# Patient Record
Sex: FEMALE | Race: BLACK OR AFRICAN AMERICAN | NOT HISPANIC OR LATINO | URBAN - METROPOLITAN AREA
[De-identification: names, ages, dates, MRNs, and addresses within clinical notes are randomized per-mention and may not be internally consistent; named-entity substitution may affect disease eponyms.]

---

## 2020-03-08 PROCEDURE — 99285 EMERGENCY DEPT VISIT HI MDM: CPT | Mod: 25

## 2020-03-08 PROCEDURE — 93010 ELECTROCARDIOGRAM REPORT: CPT | Mod: NC

## 2020-03-09 ENCOUNTER — INPATIENT (INPATIENT)
Facility: HOSPITAL | Age: 30
LOS: 2 days | Discharge: HOME | DRG: 814 | End: 2020-03-12
Attending: GENERAL ACUTE CARE HOSPITAL | Admitting: GENERAL ACUTE CARE HOSPITAL
Payer: COMMERCIAL

## 2020-03-09 VITALS
HEART RATE: 115 BPM | WEIGHT: 192.02 LBS | RESPIRATION RATE: 16 BRPM | OXYGEN SATURATION: 100 % | DIASTOLIC BLOOD PRESSURE: 83 MMHG | SYSTOLIC BLOOD PRESSURE: 133 MMHG | HEIGHT: 65 IN | TEMPERATURE: 101 F

## 2020-03-09 LAB
ALBUMIN SERPL ELPH-MCNC: 3.8 G/DL — SIGNIFICANT CHANGE UP (ref 3.4–5)
ALP SERPL-CCNC: 92 U/L — SIGNIFICANT CHANGE UP (ref 40–120)
ALT FLD-CCNC: 46 U/L — HIGH (ref 12–42)
ANION GAP SERPL CALC-SCNC: 11 MMOL/L — SIGNIFICANT CHANGE UP (ref 9–16)
ANION GAP SERPL CALC-SCNC: 9 MMOL/L — SIGNIFICANT CHANGE UP (ref 5–17)
APPEARANCE UR: CLEAR — SIGNIFICANT CHANGE UP
APTT BLD: 33.4 SEC — SIGNIFICANT CHANGE UP (ref 27.5–36.3)
APTT BLD: 34.4 SEC — SIGNIFICANT CHANGE UP (ref 27.5–36.3)
AST SERPL-CCNC: 50 U/L — HIGH (ref 15–37)
BASOPHILS # BLD AUTO: 0 K/UL — SIGNIFICANT CHANGE UP (ref 0–0.2)
BASOPHILS # BLD AUTO: 0.05 K/UL — SIGNIFICANT CHANGE UP (ref 0–0.2)
BASOPHILS NFR BLD AUTO: 0 % — SIGNIFICANT CHANGE UP (ref 0–2)
BASOPHILS NFR BLD AUTO: 0.9 % — SIGNIFICANT CHANGE UP (ref 0–2)
BILIRUB SERPL-MCNC: 0.3 MG/DL — SIGNIFICANT CHANGE UP (ref 0.2–1.2)
BILIRUB UR-MCNC: NEGATIVE — SIGNIFICANT CHANGE UP
BUN SERPL-MCNC: 5 MG/DL — LOW (ref 7–23)
BUN SERPL-MCNC: 7 MG/DL — SIGNIFICANT CHANGE UP (ref 7–23)
CALCIUM SERPL-MCNC: 8.1 MG/DL — LOW (ref 8.4–10.5)
CALCIUM SERPL-MCNC: 9 MG/DL — SIGNIFICANT CHANGE UP (ref 8.5–10.5)
CHLORIDE SERPL-SCNC: 100 MMOL/L — SIGNIFICANT CHANGE UP (ref 96–108)
CHLORIDE SERPL-SCNC: 107 MMOL/L — SIGNIFICANT CHANGE UP (ref 96–108)
CO2 SERPL-SCNC: 21 MMOL/L — LOW (ref 22–31)
CO2 SERPL-SCNC: 24 MMOL/L — SIGNIFICANT CHANGE UP (ref 22–31)
COLOR SPEC: YELLOW — SIGNIFICANT CHANGE UP
CREAT SERPL-MCNC: 0.73 MG/DL — SIGNIFICANT CHANGE UP (ref 0.5–1.3)
CREAT SERPL-MCNC: 0.97 MG/DL — SIGNIFICANT CHANGE UP (ref 0.5–1.3)
DIFF PNL FLD: ABNORMAL
EOSINOPHIL # BLD AUTO: 0 K/UL — SIGNIFICANT CHANGE UP (ref 0–0.5)
EOSINOPHIL # BLD AUTO: 0 K/UL — SIGNIFICANT CHANGE UP (ref 0–0.5)
EOSINOPHIL NFR BLD AUTO: 0 % — SIGNIFICANT CHANGE UP (ref 0–6)
EOSINOPHIL NFR BLD AUTO: 0 % — SIGNIFICANT CHANGE UP (ref 0–6)
FLU A RESULT: SIGNIFICANT CHANGE UP
FLU A RESULT: SIGNIFICANT CHANGE UP
FLUAV AG NPH QL: SIGNIFICANT CHANGE UP
FLUBV AG NPH QL: SIGNIFICANT CHANGE UP
GLUCOSE SERPL-MCNC: 100 MG/DL — HIGH (ref 70–99)
GLUCOSE SERPL-MCNC: 99 MG/DL — SIGNIFICANT CHANGE UP (ref 70–99)
GLUCOSE UR QL: NEGATIVE — SIGNIFICANT CHANGE UP
HCG UR QL: NEGATIVE — SIGNIFICANT CHANGE UP
HCT VFR BLD CALC: 38.1 % — SIGNIFICANT CHANGE UP (ref 34.5–45)
HCT VFR BLD CALC: 39.9 % — SIGNIFICANT CHANGE UP (ref 34.5–45)
HGB BLD-MCNC: 12.8 G/DL — SIGNIFICANT CHANGE UP (ref 11.5–15.5)
HGB BLD-MCNC: 13.5 G/DL — SIGNIFICANT CHANGE UP (ref 11.5–15.5)
INR BLD: 1.14 — SIGNIFICANT CHANGE UP (ref 0.88–1.16)
INR BLD: 1.16 — SIGNIFICANT CHANGE UP (ref 0.88–1.16)
KETONES UR-MCNC: NEGATIVE — SIGNIFICANT CHANGE UP
LACTATE SERPL-SCNC: 1.4 MMOL/L — SIGNIFICANT CHANGE UP (ref 0.4–2)
LEUKOCYTE ESTERASE UR-ACNC: NEGATIVE — SIGNIFICANT CHANGE UP
LYMPHOCYTES # BLD AUTO: 2.57 K/UL — SIGNIFICANT CHANGE UP (ref 1–3.3)
LYMPHOCYTES # BLD AUTO: 3.17 K/UL — SIGNIFICANT CHANGE UP (ref 1–3.3)
LYMPHOCYTES # BLD AUTO: 35 % — SIGNIFICANT CHANGE UP (ref 13–44)
LYMPHOCYTES # BLD AUTO: 57 % — HIGH (ref 13–44)
MAGNESIUM SERPL-MCNC: 1.7 MG/DL — SIGNIFICANT CHANGE UP (ref 1.6–2.6)
MCHC RBC-ENTMCNC: 30.3 PG — SIGNIFICANT CHANGE UP (ref 27–34)
MCHC RBC-ENTMCNC: 30.6 PG — SIGNIFICANT CHANGE UP (ref 27–34)
MCHC RBC-ENTMCNC: 33.6 GM/DL — SIGNIFICANT CHANGE UP (ref 32–36)
MCHC RBC-ENTMCNC: 33.8 GM/DL — SIGNIFICANT CHANGE UP (ref 32–36)
MCV RBC AUTO: 89.5 FL — SIGNIFICANT CHANGE UP (ref 80–100)
MCV RBC AUTO: 91.1 FL — SIGNIFICANT CHANGE UP (ref 80–100)
MONOCYTES # BLD AUTO: 0 K/UL — SIGNIFICANT CHANGE UP (ref 0–0.9)
MONOCYTES # BLD AUTO: 0.58 K/UL — SIGNIFICANT CHANGE UP (ref 0–0.9)
MONOCYTES NFR BLD AUTO: 0 % — LOW (ref 2–14)
MONOCYTES NFR BLD AUTO: 10.5 % — SIGNIFICANT CHANGE UP (ref 2–14)
NEUTROPHILS # BLD AUTO: 1.62 K/UL — LOW (ref 1.8–7.4)
NEUTROPHILS # BLD AUTO: 1.76 K/UL — LOW (ref 1.8–7.4)
NEUTROPHILS NFR BLD AUTO: 20 % — LOW (ref 43–77)
NEUTROPHILS NFR BLD AUTO: 28.1 % — LOW (ref 43–77)
NITRITE UR-MCNC: NEGATIVE — SIGNIFICANT CHANGE UP
NRBC # BLD: 0 /100 WBCS — SIGNIFICANT CHANGE UP (ref 0–0)
NRBC # BLD: SIGNIFICANT CHANGE UP /100 WBCS (ref 0–0)
PH UR: 6 — SIGNIFICANT CHANGE UP (ref 5–8)
PHOSPHATE SERPL-MCNC: 3 MG/DL — SIGNIFICANT CHANGE UP (ref 2.5–4.5)
PLATELET # BLD AUTO: 198 K/UL — SIGNIFICANT CHANGE UP (ref 150–400)
PLATELET # BLD AUTO: 225 K/UL — SIGNIFICANT CHANGE UP (ref 150–400)
POTASSIUM SERPL-MCNC: 3.3 MMOL/L — LOW (ref 3.5–5.3)
POTASSIUM SERPL-MCNC: 3.8 MMOL/L — SIGNIFICANT CHANGE UP (ref 3.5–5.3)
POTASSIUM SERPL-SCNC: 3.3 MMOL/L — LOW (ref 3.5–5.3)
POTASSIUM SERPL-SCNC: 3.8 MMOL/L — SIGNIFICANT CHANGE UP (ref 3.5–5.3)
PROT SERPL-MCNC: 8.1 G/DL — SIGNIFICANT CHANGE UP (ref 6.4–8.2)
PROT UR-MCNC: NEGATIVE MG/DL — SIGNIFICANT CHANGE UP
PROTHROM AB SERPL-ACNC: 12.9 SEC — SIGNIFICANT CHANGE UP (ref 10–12.9)
PROTHROM AB SERPL-ACNC: 13 SEC — HIGH (ref 10–12.9)
RBC # BLD: 4.18 M/UL — SIGNIFICANT CHANGE UP (ref 3.8–5.2)
RBC # BLD: 4.46 M/UL — SIGNIFICANT CHANGE UP (ref 3.8–5.2)
RBC # FLD: 12.6 % — SIGNIFICANT CHANGE UP (ref 10.3–14.5)
RBC # FLD: 12.7 % — SIGNIFICANT CHANGE UP (ref 10.3–14.5)
RSV RESULT: SIGNIFICANT CHANGE UP
RSV RNA RESP QL NAA+PROBE: SIGNIFICANT CHANGE UP
SODIUM SERPL-SCNC: 135 MMOL/L — SIGNIFICANT CHANGE UP (ref 132–145)
SODIUM SERPL-SCNC: 137 MMOL/L — SIGNIFICANT CHANGE UP (ref 135–145)
SP GR SPEC: <=1.005 — SIGNIFICANT CHANGE UP (ref 1–1.03)
UROBILINOGEN FLD QL: 0.2 E.U./DL — SIGNIFICANT CHANGE UP
WBC # BLD: 5.57 K/UL — SIGNIFICANT CHANGE UP (ref 3.8–10.5)
WBC # BLD: 7.35 K/UL — SIGNIFICANT CHANGE UP (ref 3.8–10.5)
WBC # FLD AUTO: 5.57 K/UL — SIGNIFICANT CHANGE UP (ref 3.8–10.5)
WBC # FLD AUTO: 7.35 K/UL — SIGNIFICANT CHANGE UP (ref 3.8–10.5)

## 2020-03-09 PROCEDURE — 93970 EXTREMITY STUDY: CPT | Mod: 26

## 2020-03-09 PROCEDURE — 99222 1ST HOSP IP/OBS MODERATE 55: CPT

## 2020-03-09 PROCEDURE — 99253 IP/OBS CNSLTJ NEW/EST LOW 45: CPT | Mod: GC

## 2020-03-09 PROCEDURE — 99220: CPT | Mod: 25

## 2020-03-09 PROCEDURE — 71045 X-RAY EXAM CHEST 1 VIEW: CPT | Mod: 26

## 2020-03-09 PROCEDURE — 74174 CTA ABD&PLVS W/CONTRAST: CPT | Mod: 26

## 2020-03-09 PROCEDURE — 71275 CT ANGIOGRAPHY CHEST: CPT | Mod: 26

## 2020-03-09 PROCEDURE — 74177 CT ABD & PELVIS W/CONTRAST: CPT | Mod: 26,59

## 2020-03-09 PROCEDURE — 93306 TTE W/DOPPLER COMPLETE: CPT | Mod: 26

## 2020-03-09 RX ORDER — METRONIDAZOLE 500 MG
500 TABLET ORAL ONCE
Refills: 0 | Status: COMPLETED | OUTPATIENT
Start: 2020-03-09 | End: 2020-03-09

## 2020-03-09 RX ORDER — SODIUM CHLORIDE 9 MG/ML
1000 INJECTION, SOLUTION INTRAVENOUS
Refills: 0 | Status: DISCONTINUED | OUTPATIENT
Start: 2020-03-09 | End: 2020-03-11

## 2020-03-09 RX ORDER — CEFTRIAXONE 500 MG/1
1000 INJECTION, POWDER, FOR SOLUTION INTRAMUSCULAR; INTRAVENOUS ONCE
Refills: 0 | Status: COMPLETED | OUTPATIENT
Start: 2020-03-09 | End: 2020-03-09

## 2020-03-09 RX ORDER — KETOROLAC TROMETHAMINE 30 MG/ML
30 SYRINGE (ML) INJECTION ONCE
Refills: 0 | Status: DISCONTINUED | OUTPATIENT
Start: 2020-03-09 | End: 2020-03-09

## 2020-03-09 RX ORDER — ONDANSETRON 8 MG/1
4 TABLET, FILM COATED ORAL EVERY 6 HOURS
Refills: 0 | Status: DISCONTINUED | OUTPATIENT
Start: 2020-03-09 | End: 2020-03-12

## 2020-03-09 RX ORDER — ACETAMINOPHEN 500 MG
1000 TABLET ORAL ONCE
Refills: 0 | Status: DISCONTINUED | OUTPATIENT
Start: 2020-03-09 | End: 2020-03-12

## 2020-03-09 RX ORDER — SODIUM CHLORIDE 9 MG/ML
2700 INJECTION INTRAMUSCULAR; INTRAVENOUS; SUBCUTANEOUS ONCE
Refills: 0 | Status: COMPLETED | OUTPATIENT
Start: 2020-03-09 | End: 2020-03-09

## 2020-03-09 RX ORDER — IOHEXOL 300 MG/ML
30 INJECTION, SOLUTION INTRAVENOUS ONCE
Refills: 0 | Status: COMPLETED | OUTPATIENT
Start: 2020-03-09 | End: 2020-03-09

## 2020-03-09 RX ADMIN — SODIUM CHLORIDE 2700 MILLILITER(S): 9 INJECTION INTRAMUSCULAR; INTRAVENOUS; SUBCUTANEOUS at 00:30

## 2020-03-09 RX ADMIN — Medication 30 MILLIGRAM(S): at 00:31

## 2020-03-09 RX ADMIN — Medication 100 MILLIGRAM(S): at 00:30

## 2020-03-09 RX ADMIN — IOHEXOL 30 MILLILITER(S): 300 INJECTION, SOLUTION INTRAVENOUS at 00:31

## 2020-03-09 RX ADMIN — CEFTRIAXONE 100 MILLIGRAM(S): 500 INJECTION, POWDER, FOR SOLUTION INTRAMUSCULAR; INTRAVENOUS at 00:30

## 2020-03-09 NOTE — H&P ADULT - ASSESSMENT
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

## 2020-03-09 NOTE — ED CDU PROVIDER INITIAL DAY NOTE - MEDICAL DECISION MAKING DETAILS
Upper abd pain and fever today.  Ct abd ordered, labs, abx.  Sepsis code protocol followed.  Will place in CDU for diagnostic uncertainty and therapeutic intensity.

## 2020-03-09 NOTE — ED ADULT NURSE NOTE - CHPI ED NUR SYMPTOMS NEG
no diarrhea/no chills/no abdominal distension/no blood in stool/no burning urination/no hematuria/no vomiting/no dysuria/no nausea

## 2020-03-09 NOTE — H&P ADULT - HISTORY OF PRESENT ILLNESS
28 yo F with no significant PMH, PSH of D&Cx2 presents with 1 day history of abdominal pain and subjective fever. Patient reports that the pain began yesterday morning. Patient reports initially it felt like gas pain but it wouldn't go away. Patient reports she took her temperature at home and it was 100.8 so she went to the ER. Patient describes the pain as a vague ache-like epigastric pain. Patient denies nausea, vomiting, chest pain, and shortness of breath. Patient reports that she is passing flatus and had a bowel movement yesterday. Patient last had something to eat around 11pm yesterday when she had some crackers.

## 2020-03-09 NOTE — CONSULT NOTE ADULT - SUBJECTIVE AND OBJECTIVE BOX
Vascular Attending:        HPI:  28 yo F with no significant PMH, PSH of D&Cx2 presents with 1 day history of abdominal pain and subjective fever. Patient reports that the pain began yesterday morning. Patient reports initially it felt like gas pain but it wouldn't go away. Patient reports she took her temperature at home and it was 100.8 so she went to the ER. Patient describes the pain as a vague ache-like epigastric pain. Patient denies nausea, vomiting, chest pain, and shortness of breath. Patient reports that she is passing flatus and had a bowel movement yesterday. Patient last had something to eat around 11pm yesterday when she had some crackers. (09 Mar 2020 08:15)    Vascular consulted for < from: CT Abdomen and Pelvis w/ Oral Cont and w/ IV Cont (03.09.20 @ 03:11) > IMPRESSION: A few wedge-shaped areas of hypodensity in the spleen suspicious for infarctions. < end of copied text > Patient denies smoking, recent travel, BCP, CP/SOB or thrombus.         PAST MEDICAL & SURGICAL HISTORY:  No pertinent past medical history  No significant past surgical history      MEDICATIONS  (STANDING):  acetaminophen  IVPB .. 1000 milliGRAM(s) IV Intermittent once  lactated ringers. 1000 milliLiter(s) (100 mL/Hr) IV Continuous <Continuous>    MEDICATIONS  (PRN):  ondansetron Injectable 4 milliGRAM(s) IV Push every 6 hours PRN Nausea and/or Vomiting      Allergies    No Known Allergies    Intolerances        SOCIAL HISTORY:    FAMILY HISTORY:      Vital Signs Last 24 Hrs  T(C): 36.3 (09 Mar 2020 08:09), Max: 38.6 (09 Mar 2020 00:00)  T(F): 97.3 (09 Mar 2020 08:09), Max: 101.4 (09 Mar 2020 00:00)  HR: 63 (09 Mar 2020 08:09) (63 - 115)  BP: 113/71 (09 Mar 2020 08:09) (110/75 - 133/83)  BP(mean): 83 (09 Mar 2020 01:43) (83 - 83)  RR: 17 (09 Mar 2020 08:09) (16 - 17)  SpO2: 98% (09 Mar 2020 08:09) (98% - 100%)    PHYSICAL EXAM:      Constitutional: NAD    Respiratory: nonlabored breathing    Cardiovascular: s1s2 RRR    Gastrointestinal: soft nt/nd    Extremities: WWP    Vascular: 2+ pal pulses throughout    Neurological: intact      LABS:                        12.8   5.57  )-----------( 198      ( 09 Mar 2020 10:50 )             38.1     03-09    137  |  107  |  5<L>  ----------------------------<  99  3.8   |  21<L>  |  0.73    Ca    8.1<L>      09 Mar 2020 10:50    TPro  8.1  /  Alb  3.8  /  TBili  0.3  /  DBili  x   /  AST  50<H>  /  ALT  46<H>  /  AlkPhos  92  03-09    PT/INR - ( 09 Mar 2020 00:29 )   PT: 12.9 sec;   INR: 1.16          PTT - ( 09 Mar 2020 00:29 )  PTT:33.4 sec  Urinalysis Basic - ( 09 Mar 2020 00:29 )    Color: Yellow / Appearance: Clear / SG: <=1.005 / pH: x  Gluc: x / Ketone: NEGATIVE  / Bili: NEGATIVE / Urobili: 0.2 E.U./dL   Blood: x / Protein: NEGATIVE mg/dL / Nitrite: NEGATIVE   Leuk Esterase: NEGATIVE / RBC: < 5 /HPF / WBC < 5 /HPF   Sq Epi: x / Non Sq Epi: 0-5 /HPF / Bacteria: Present /HPF

## 2020-03-09 NOTE — ED ADULT TRIAGE NOTE - CHIEF COMPLAINT QUOTE
Walk in with c/o epigastric pain, mild nausea, loss of appetite and subjective fevers x1week. No v/d.

## 2020-03-09 NOTE — H&P ADULT - NSHPLABSRESULTS_GEN_ALL_CORE
13.5   7.35  )-----------( 225      ( 09 Mar 2020 00:29 )             39.9       03-09    135  |  100  |  7   ----------------------------<  100<H>  3.3<L>   |  24  |  0.97    Ca    9.0      09 Mar 2020 00:29    TPro  8.1  /  Alb  3.8  /  TBili  0.3  /  DBili  x   /  AST  50<H>  /  ALT  46<H>  /  AlkPhos  92  03-09      < from: CT Abdomen and Pelvis w/ Oral Cont and w/ IV Cont (03.09.20 @ 03:11) >    EXAM:  CT ABDOMEN AND PELVIS OC IC                           PROCEDURE DATE:  03/09/2020          INTERPRETATION:  CT of the ABDOMEN and PELVIS with intravenous contrast dated 3/9/2020 3:11 AM    INDICATION: fever 101 and upper abd pain    TECHNIQUE: CT of the abdomen and pelvis was performed using oral and intravenous contrast. Axial, sagittal and coronal images were produced and reviewed.    PRIOR STUDIES: None.    FINDINGS: Images of the lower chest demonstrate clear lung bases.    The liveris normal in appearance.  No radiopaque stones are seen in the gallbladder.  The pancreas is normal in appearance.  The spleen demonstrates a few wedge shaped areas of hypodensity suspicious for infarctions.     The adrenal glands are unremarkable. The kidneys are normal in appearance.        No abdominal aortic aneurysm is seen. No lymphadenopathy is seen.     Evaluation of the bowel demonstrates no obstruction or appreciable wall thickening. Normal appendix. No pneumoperitoneum. Tiny fat-containing umbilical hernia. No ascites is seen.    Images of the pelvis demonstrate the uterus and adnexae to be normal in appearance.  Tampon within the vagina. No filling defects in the bladder.    Evaluation of the osseous structures demonstrates no significant abnormality.      IMPRESSION:  A few wedge-shaped areas of hypodensity in the spleen suspicious for infarctions.                Thank you for the opportunity to participate in the care of this patient.    < end of copied text >

## 2020-03-09 NOTE — H&P ADULT - NSHPPHYSICALEXAM_GEN_ALL_CORE
Gen: NAD, resting comfortably in bed  Resp: CTABL  Cardiac: RRR. no murmurs, rubs, or gallops  Abd: soft, nondistended, minimal epigastric tenderness.   Extremities: WWP, no edema 2+ PT pulses bilaterally

## 2020-03-10 LAB
ANION GAP SERPL CALC-SCNC: 13 MMOL/L — SIGNIFICANT CHANGE UP (ref 5–17)
BUN SERPL-MCNC: 6 MG/DL — LOW (ref 7–23)
CALCIUM SERPL-MCNC: 8.8 MG/DL — SIGNIFICANT CHANGE UP (ref 8.4–10.5)
CHLORIDE SERPL-SCNC: 101 MMOL/L — SIGNIFICANT CHANGE UP (ref 96–108)
CO2 SERPL-SCNC: 22 MMOL/L — SIGNIFICANT CHANGE UP (ref 22–31)
CREAT SERPL-MCNC: 0.8 MG/DL — SIGNIFICANT CHANGE UP (ref 0.5–1.3)
CULTURE RESULTS: SIGNIFICANT CHANGE UP
FACT V ACT/NOR PPP: 108 % — SIGNIFICANT CHANGE UP (ref 70–143)
GLUCOSE SERPL-MCNC: 86 MG/DL — SIGNIFICANT CHANGE UP (ref 70–99)
HCT VFR BLD CALC: 40.2 % — SIGNIFICANT CHANGE UP (ref 34.5–45)
HGB BLD-MCNC: 13.5 G/DL — SIGNIFICANT CHANGE UP (ref 11.5–15.5)
MAGNESIUM SERPL-MCNC: 1.8 MG/DL — SIGNIFICANT CHANGE UP (ref 1.6–2.6)
MCHC RBC-ENTMCNC: 30.3 PG — SIGNIFICANT CHANGE UP (ref 27–34)
MCHC RBC-ENTMCNC: 33.6 GM/DL — SIGNIFICANT CHANGE UP (ref 32–36)
MCV RBC AUTO: 90.1 FL — SIGNIFICANT CHANGE UP (ref 80–100)
NRBC # BLD: 0 /100 WBCS — SIGNIFICANT CHANGE UP (ref 0–0)
PHOSPHATE SERPL-MCNC: 3.5 MG/DL — SIGNIFICANT CHANGE UP (ref 2.5–4.5)
PLATELET # BLD AUTO: 215 K/UL — SIGNIFICANT CHANGE UP (ref 150–400)
POTASSIUM SERPL-MCNC: 3.4 MMOL/L — LOW (ref 3.5–5.3)
POTASSIUM SERPL-SCNC: 3.4 MMOL/L — LOW (ref 3.5–5.3)
RBC # BLD: 4.46 M/UL — SIGNIFICANT CHANGE UP (ref 3.8–5.2)
RBC # FLD: 12.5 % — SIGNIFICANT CHANGE UP (ref 10.3–14.5)
SODIUM SERPL-SCNC: 136 MMOL/L — SIGNIFICANT CHANGE UP (ref 135–145)
SPECIMEN SOURCE: SIGNIFICANT CHANGE UP
WBC # BLD: 8.3 K/UL — SIGNIFICANT CHANGE UP (ref 3.8–10.5)
WBC # FLD AUTO: 8.3 K/UL — SIGNIFICANT CHANGE UP (ref 3.8–10.5)

## 2020-03-10 PROCEDURE — 99253 IP/OBS CNSLTJ NEW/EST LOW 45: CPT

## 2020-03-10 PROCEDURE — 99232 SBSQ HOSP IP/OBS MODERATE 35: CPT

## 2020-03-10 PROCEDURE — 93308 TTE F-UP OR LMTD: CPT | Mod: 26

## 2020-03-10 RX ORDER — APIXABAN 2.5 MG/1
5 TABLET, FILM COATED ORAL EVERY 12 HOURS
Refills: 0 | Status: CANCELLED | OUTPATIENT
Start: 2020-03-17 | End: 2020-03-12

## 2020-03-10 RX ORDER — APIXABAN 2.5 MG/1
10 TABLET, FILM COATED ORAL EVERY 12 HOURS
Refills: 0 | Status: DISCONTINUED | OUTPATIENT
Start: 2020-03-10 | End: 2020-03-12

## 2020-03-10 RX ORDER — POTASSIUM CHLORIDE 20 MEQ
40 PACKET (EA) ORAL EVERY 4 HOURS
Refills: 0 | Status: COMPLETED | OUTPATIENT
Start: 2020-03-10 | End: 2020-03-10

## 2020-03-10 RX ADMIN — Medication 40 MILLIEQUIVALENT(S): at 13:19

## 2020-03-10 RX ADMIN — APIXABAN 10 MILLIGRAM(S): 2.5 TABLET, FILM COATED ORAL at 17:32

## 2020-03-10 RX ADMIN — Medication 40 MILLIEQUIVALENT(S): at 17:32

## 2020-03-10 NOTE — PROGRESS NOTE ADULT - ASSESSMENT
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     - echo with bubble study  - Please f/u hypercoagulable work up  - Recommend started pt on Eliquis 10 mg BID X1week, then Eliquis 5 mg BIDx3 weeks for a total of one month of anticoagulation  - d/w chief resident and Dr. 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     - echo with bubble study demonstrates possibility of intracardiac shunt vs. pulmonary AV malformation, please consult cardiology for further evaluation of possible PFO   - Please f/u hypercoagulable work up  - Recommend started pt on Eliquis 10 mg BID X1week, then Eliquis 5 mg BIDx3 weeks for a total of one month of anticoagulation  - d/w chief resident and Dr. Smart

## 2020-03-10 NOTE — PROGRESS NOTE ADULT - SUBJECTIVE AND OBJECTIVE BOX
SUBJECTIVE: Afebrile, HD stable with SAHRA overnight, tolerating diet, reports equivocal LUQ pain, denies N/V/ fever/ chills.     Vital Signs Last 24 Hrs  T(C): 37.3 (10 Mar 2020 05:01), Max: 37.4 (09 Mar 2020 13:59)  T(F): 99.2 (10 Mar 2020 05:01), Max: 99.3 (09 Mar 2020 13:59)  HR: 81 (10 Mar 2020 05:01) (60 - 81)  BP: 121/69 (10 Mar 2020 05:01) (111/71 - 121/69)  BP(mean): --  RR: 17 (10 Mar 2020 05:01) (15 - 17)  SpO2: 99% (10 Mar 2020 05:01) (97% - 100%)    General: NAD, resting comfortably  Neuro: AAOX3, EOMI, PERRLA, no scleral icterus  Pulm: CTAB, Nonlabored breathing  CV: S1/S2 normal, no murmurs  Abdomen: soft, nondistended, mild equivocal LUQ tenderness, no rebound, no guarding  Extremities: WWP, No LE edema b/l        LABS:                        12.8   5.57  )-----------( 198      ( 09 Mar 2020 10:50 )             38.1     03-09    137  |  107  |  5<L>  ----------------------------<  99  3.8   |  21<L>  |  0.73    Ca    8.1<L>      09 Mar 2020 10:50  Phos  3.0     03-09  Mg     1.7     03-09    TPro  8.1  /  Alb  3.8  /  TBili  0.3  /  DBili  x   /  AST  50<H>  /  ALT  46<H>  /  AlkPhos  92  03-09    PT/INR - ( 09 Mar 2020 16:58 )   PT: 13.0 sec;   INR: 1.14       < from: TTE Echo Complete w/o contrast w/ Doppler (03.09.20 @ 11:50) >  --------------------------------------------------------------------------------  CONCLUSIONS:     1. Normal left and right ventricular size and systolic function.   2. No significant valvular disease.   3. Small pericardial effusion.    < end of copied text >    < from: US Duplex Venous Lower Ext Complete, Bilateral (03.09.20 @ 14:30) >      IMPRESSION:  No vein thrombosis seen.    < end of copied text >         PTT - ( 09 Mar 2020 16:58 )  PTT:34.4 sec    < from: CT Angio Abdomen and Pelvis w/ IV Cont (03.09.20 @ 15:26) >    History: 30yo F with splenic infarcts.    Technique: CTA (CT ANGIOGRAPHY) of the chest, abdomen and pelvis was then performed using intravenous contrast. Multiplanar images of the chest, abdomen and pelvis were reconstructed on an independent work-station. Image postprocessing including production of axial, coronal and sagittal multiplanar reformatted images and coronal and sagittal maximum intensity projections (MIPs).     Comparison: CT abdomen pelvis from 3/9/2020 at 3:02 AM.    Findings:     Lungs and large airways: 0.5 cm left upper lobe pulmonary nodule on image 309 series 6, branching left upper lobe pulmonary nodules on image 325 series 6 consistent with small airway disease.    Pleura:  No pleural effusion.    Mediastinum and hilar regions: No thoracic lymphadenopathy.    Heart and pericardium:  Heart size is normal. No pericardial effusion.    Vessels:  Unremarkable. No evidence of aortic aneurysm, aortic dissection, aortic ulcerated plaques or aortic filling defects. No filling defects seen in the SMA, celiac axis, splenic artery, KIM.     Chest wall and lower neck:  Unremarkable.    Liver:  Unremarkable. Heterogeneous enhancement consistent with arterial phase imaging.    Gallbladder: No radiopaque stones gallbladder.    Spleen:  Again seen are multiple wedge-shaped areas of hypodensity within the spleen compatible with multiple splenic infarcts. There has been an apparent increase in the size and number of the splenic infarcts, however this is study was performed in the arterial phase and the prior study inthe portal venous phase, so it is somewhat difficult to make accurate comparisons of the splenic enhancement between the 2 studies.    Pancreas:  Unremarkable.    Adrenal glands:  Unremarkable.    Kidneys: Unremarkable.    Abdominal and pelvic adenopathy:  No lymphadenopathy in abdomen or pelvis.    Ascites: None.    Gastrointestinal tract: Evaluation of the GI tract shows oral contrast to the level of the rectum. This contrast is residual from prior CT from 3/9/2020. There is no appreciable luminal dilation or bowel wall thickening. The appendix is normal    Pelvic organs: Within normal limits.    Soft tissues: Within normal limits.    Bones: Within normal limits.    Impression:   Multiple splenic infarcts again noted, with an apparent increase in size and number when compared to the prior CT.         < end of copied text > SUBJECTIVE: Afebrile, HD stable with SAHRA overnight, tolerating diet, reports equivocal LUQ pain, denies N/V/ fever/ chills.     Vital Signs Last 24 Hrs  T(C): 37.3 (10 Mar 2020 05:01), Max: 37.4 (09 Mar 2020 13:59)  T(F): 99.2 (10 Mar 2020 05:01), Max: 99.3 (09 Mar 2020 13:59)  HR: 81 (10 Mar 2020 05:01) (60 - 81)  BP: 121/69 (10 Mar 2020 05:01) (111/71 - 121/69)  BP(mean): --  RR: 17 (10 Mar 2020 05:01) (15 - 17)  SpO2: 99% (10 Mar 2020 05:01) (97% - 100%)    General: NAD, resting comfortably  Neuro: AAOX3, EOMI, PERRLA, no scleral icterus  Pulm: CTAB, Nonlabored breathing  CV: S1/S2 normal, no murmurs  Abdomen: soft, nondistended, mild equivocal LUQ tenderness, no rebound, no guarding  Extremities: WWP, No LE edema b/l        LABS:                        12.8   5.57  )-----------( 198      ( 09 Mar 2020 10:50 )             38.1     03-09    137  |  107  |  5<L>  ----------------------------<  99  3.8   |  21<L>  |  0.73    Ca    8.1<L>      09 Mar 2020 10:50  Phos  3.0     03-09  Mg     1.7     03-09    TPro  8.1  /  Alb  3.8  /  TBili  0.3  /  DBili  x   /  AST  50<H>  /  ALT  46<H>  /  AlkPhos  92  03-09    PT/INR - ( 09 Mar 2020 16:58 )   PT: 13.0 sec;   INR: 1.14       < from: TTE Echo Complete w/o contrast w/ Doppler (03.09.20 @ 11:50) >  --------------------------------------------------------------------------------  CONCLUSIONS:     1. Normal left and right ventricular size and systolic function.   2. No significant valvular disease.   3. Small pericardial effusion.    < end of copied text >    < from: US Duplex Venous Lower Ext Complete, Bilateral (03.09.20 @ 14:30) >      IMPRESSION:  No vein thrombosis seen.    < end of copied text >         PTT - ( 09 Mar 2020 16:58 )  PTT:34.4 sec    < from: CT Angio Abdomen and Pelvis w/ IV Cont (03.09.20 @ 15:26) >    History: 28yo F with splenic infarcts.    Technique: CTA (CT ANGIOGRAPHY) of the chest, abdomen and pelvis was then performed using intravenous contrast. Multiplanar images of the chest, abdomen and pelvis were reconstructed on an independent work-station. Image postprocessing including production of axial, coronal and sagittal multiplanar reformatted images and coronal and sagittal maximum intensity projections (MIPs).     Comparison: CT abdomen pelvis from 3/9/2020 at 3:02 AM.    Findings:     Lungs and large airways: 0.5 cm left upper lobe pulmonary nodule on image 309 series 6, branching left upper lobe pulmonary nodules on image 325 series 6 consistent with small airway disease.    Pleura:  No pleural effusion.    Mediastinum and hilar regions: No thoracic lymphadenopathy.    Heart and pericardium:  Heart size is normal. No pericardial effusion.    Vessels:  Unremarkable. No evidence of aortic aneurysm, aortic dissection, aortic ulcerated plaques or aortic filling defects. No filling defects seen in the SMA, celiac axis, splenic artery, KIM.     Chest wall and lower neck:  Unremarkable.    Liver:  Unremarkable. Heterogeneous enhancement consistent with arterial phase imaging.    Gallbladder: No radiopaque stones gallbladder.    Spleen:  Again seen are multiple wedge-shaped areas of hypodensity within the spleen compatible with multiple splenic infarcts. There has been an apparent increase in the size and number of the splenic infarcts, however this is study was performed in the arterial phase and the prior study inthe portal venous phase, so it is somewhat difficult to make accurate comparisons of the splenic enhancement between the 2 studies.    Pancreas:  Unremarkable.    Adrenal glands:  Unremarkable.    Kidneys: Unremarkable.    Abdominal and pelvic adenopathy:  No lymphadenopathy in abdomen or pelvis.    Ascites: None.    Gastrointestinal tract: Evaluation of the GI tract shows oral contrast to the level of the rectum. This contrast is residual from prior CT from 3/9/2020. There is no appreciable luminal dilation or bowel wall thickening. The appendix is normal    Pelvic organs: Within normal limits.    Soft tissues: Within normal limits.    Bones: Within normal limits.    Impression:   Multiple splenic infarcts again noted, with an apparent increase in size and number when compared to the prior CT.         < end of copied text >    < from: Echo W Bubble w/o Doppler Complete (03.10.20 @ 09:35) >  FINDINGS:     Interatrial Septum:  Injection with agitated saline reveals late bubbles (after 3 heart beats) - difficult to determine intracardiac shunt vs pulmonary AV malformation.     -----------------    < end of copied text >

## 2020-03-10 NOTE — PROGRESS NOTE ADULT - SUBJECTIVE AND OBJECTIVE BOX
SUBJECTIVE: Patient seen and examined bedside. Patient has no complaints. Patient is passing flatus.        Vital Signs Last 24 Hrs  T(C): 37.3 (10 Mar 2020 05:01), Max: 37.4 (09 Mar 2020 13:59)  T(F): 99.2 (10 Mar 2020 05:01), Max: 99.3 (09 Mar 2020 13:59)  HR: 81 (10 Mar 2020 05:01) (60 - 81)  BP: 121/69 (10 Mar 2020 05:01) (111/71 - 121/69)  BP(mean): --  RR: 17 (10 Mar 2020 05:01) (15 - 17)  SpO2: 99% (10 Mar 2020 05:01) (97% - 100%)  I&O's Detail    09 Mar 2020 07:01  -  10 Mar 2020 07:00  --------------------------------------------------------  IN:    lactated ringers.: 1200 mL  Total IN: 1200 mL    OUT:    Voided: 2100 mL  Total OUT: 2100 mL    Total NET: -900 mL          General: NAD, resting comfortably in bed  C/V: NSR  Pulm: Nonlabored breathing, no respiratory distress  Abd: soft, nondistended, minimally LUQ tenderness,  Extrem: WWP, no edema, SCDs in place        LABS:                        12.8   5.57  )-----------( 198      ( 09 Mar 2020 10:50 )             38.1     03-09    137  |  107  |  5<L>  ----------------------------<  99  3.8   |  21<L>  |  0.73    Ca    8.1<L>      09 Mar 2020 10:50  Phos  3.0     03-09  Mg     1.7     03-09    TPro  8.1  /  Alb  3.8  /  TBili  0.3  /  DBili  x   /  AST  50<H>  /  ALT  46<H>  /  AlkPhos  92  03-09    PT/INR - ( 09 Mar 2020 16:58 )   PT: 13.0 sec;   INR: 1.14          PTT - ( 09 Mar 2020 16:58 )  PTT:34.4 sec  Urinalysis Basic - ( 09 Mar 2020 00:29 )    Color: Yellow / Appearance: Clear / SG: <=1.005 / pH: x  Gluc: x / Ketone: NEGATIVE  / Bili: NEGATIVE / Urobili: 0.2 E.U./dL   Blood: x / Protein: NEGATIVE mg/dL / Nitrite: NEGATIVE   Leuk Esterase: NEGATIVE / RBC: < 5 /HPF / WBC < 5 /HPF   Sq Epi: x / Non Sq Epi: 0-5 /HPF / Bacteria: Present /HPF        RADIOLOGY & ADDITIONAL STUDIES:

## 2020-03-10 NOTE — CONSULT NOTE ADULT - SUBJECTIVE AND OBJECTIVE BOX
HPI:  28 yo F with no significant PMH, PSH of D&Cx2 presents with 1 day history of abdominal pain and subjective fever, admitted with multiple splenic infarcts. She began experiencing pain over the past few days which was intolerable on Sunday. Denies any associated nausea, vomitting, diaphoresis. She did experience dyspepsia with the abdominal discomfort. She took her temperature at home and it was 100.8 so she went to the ER. Denies history of blood clots, recent travel, OCP use, spontaneous abortions, family history of SCD.       CARDIAC DIAGNOSTIC TESTING:        ECG: NSR    ECHO FINDINGS:  1. Normal left and right ventricular size and systolic function.  2. No significant valvular disease.  3. Small pericardial effusion.  4. Injection with agitated saline reveals late bubbles (after 3 heart beats) - difficult to determine  intracardiac shunt vs pulmonary AV malformation.    HOME MEDICATIONS  none    MEDICATIONS  (STANDING):  acetaminophen  IVPB .. 1000 milliGRAM(s) IV Intermittent once  apixaban 10 milliGRAM(s) Oral every 12 hours  lactated ringers. 1000 milliLiter(s) (100 mL/Hr) IV Continuous <Continuous>    MEDICATIONS  (PRN):  ondansetron Injectable 4 milliGRAM(s) IV Push every 6 hours PRN Nausea and/or Vomiting      FAMILY HISTORY:  Mother w/ CAD  Denies family history of CVA, VTE, SCD    SOCIAL HISTORY:  - Smoking: denies  - Alcohol: social  - Recreational drug use: denies      REVIEW OF SYSTEMS:  12 pt ROS negative except as stated in HPI    Vitals past 24 Hours: T(C): 37.2 (03-10-20 @ 16:58), Max: 37.3 (03-10-20 @ 05:01)  HR: 79 (03-10-20 @ 16:58) (72 - 81)  BP: 120/75 (03-10-20 @ 16:58) (110/71 - 121/69)  RR: 17 (03-10-20 @ 16:58) (16 - 17)  SpO2: 100% (03-10-20 @ 16:58) (99% - 100%)	    PHYSICAL EXAM:  GEN: No acute respiratory distress, appears stated age	  HEENT: Anicteric sclera, PERRL, EOMI  Cardiovascular: S1 S2, No JVD  Respiratory: Lungs clear to auscultation, no rrw  Gastrointestinal:  BS+, soft, non distended  Neurologic: Non-focal, AAO x 3, strength grossly normal in all extremeties  Extremities: Normal range of motion, No clubbing, cyanosis or edema  Vascular: Radial 2+, DP 2+      I&O's Detail    09 Mar 2020 07:01  -  10 Mar 2020 07:00  --------------------------------------------------------  IN:    lactated ringers.: 1200 mL  Total IN: 1200 mL    OUT:    Voided: 2100 mL  Total OUT: 2100 mL    Total NET: -900 mL      10 Mar 2020 07:01  -  10 Mar 2020 19:15  --------------------------------------------------------  IN:  Total IN: 0 mL    OUT:    Voided: 800 mL  Total OUT: 800 mL    Total NET: -800 mL          LABS:                        13.5   8.30  )-----------( 215      ( 10 Mar 2020 10:36 )             40.2     03-10    136  |  101  |  6<L>  ----------------------------<  86  3.4<L>   |  22  |  0.80    Ca    8.8      10 Mar 2020 10:36  Phos  3.5     03-10  Mg     1.8     03-10    TPro  8.1  /  Alb  3.8  /  TBili  0.3  /  DBili  x   /  AST  50<H>  /  ALT  46<H>  /  AlkPhos  92  03-09        PT/INR - ( 09 Mar 2020 16:58 )   PT: 13.0 sec;   INR: 1.14          PTT - ( 09 Mar 2020 16:58 )  PTT:34.4 sec  Urinalysis Basic - ( 09 Mar 2020 00:29 )    Color: Yellow / Appearance: Clear / SG: <=1.005 / pH: x  Gluc: x / Ketone: NEGATIVE  / Bili: NEGATIVE / Urobili: 0.2 E.U./dL   Blood: x / Protein: NEGATIVE mg/dL / Nitrite: NEGATIVE   Leuk Esterase: NEGATIVE / RBC: < 5 /HPF / WBC < 5 /HPF   Sq Epi: x / Non Sq Epi: 0-5 /HPF / Bacteria: Present /HPF      I&O's Summary    09 Mar 2020 07:01  -  10 Mar 2020 07:00  --------------------------------------------------------  IN: 1200 mL / OUT: 2100 mL / NET: -900 mL    10 Mar 2020 07:01  -  10 Mar 2020 19:15  --------------------------------------------------------  IN: 0 mL / OUT: 800 mL / NET: -800 mL        RADIOLOGY & ADDITIONAL STUDIES:

## 2020-03-10 NOTE — PROGRESS NOTE ADULT - ASSESSMENT
28 yo F with no sigificant PMH presents with 1 day history of abdominal pain, CT concerning for splenic infarct current hemoglobin stable      NPO/IVF  pain/nausea control prn  SCDs/IS  AM labs

## 2020-03-11 LAB
ANION GAP SERPL CALC-SCNC: 10 MMOL/L — SIGNIFICANT CHANGE UP (ref 5–17)
AT III ACT/NOR PPP CHRO: 110 % — SIGNIFICANT CHANGE UP (ref 85–135)
BUN SERPL-MCNC: 3 MG/DL — LOW (ref 7–23)
CALCIUM SERPL-MCNC: 8.7 MG/DL — SIGNIFICANT CHANGE UP (ref 8.4–10.5)
CHLORIDE SERPL-SCNC: 103 MMOL/L — SIGNIFICANT CHANGE UP (ref 96–108)
CO2 SERPL-SCNC: 23 MMOL/L — SIGNIFICANT CHANGE UP (ref 22–31)
CREAT SERPL-MCNC: 0.79 MG/DL — SIGNIFICANT CHANGE UP (ref 0.5–1.3)
GLUCOSE SERPL-MCNC: 105 MG/DL — HIGH (ref 70–99)
HCT VFR BLD CALC: 35.8 % — SIGNIFICANT CHANGE UP (ref 34.5–45)
HGB BLD-MCNC: 12.2 G/DL — SIGNIFICANT CHANGE UP (ref 11.5–15.5)
MAGNESIUM SERPL-MCNC: 1.9 MG/DL — SIGNIFICANT CHANGE UP (ref 1.6–2.6)
MCHC RBC-ENTMCNC: 30.3 PG — SIGNIFICANT CHANGE UP (ref 27–34)
MCHC RBC-ENTMCNC: 34.1 GM/DL — SIGNIFICANT CHANGE UP (ref 32–36)
MCV RBC AUTO: 89.1 FL — SIGNIFICANT CHANGE UP (ref 80–100)
NRBC # BLD: 0 /100 WBCS — SIGNIFICANT CHANGE UP (ref 0–0)
PHOSPHATE SERPL-MCNC: 3.1 MG/DL — SIGNIFICANT CHANGE UP (ref 2.5–4.5)
PLATELET # BLD AUTO: 211 K/UL — SIGNIFICANT CHANGE UP (ref 150–400)
POTASSIUM SERPL-MCNC: 3.8 MMOL/L — SIGNIFICANT CHANGE UP (ref 3.5–5.3)
POTASSIUM SERPL-SCNC: 3.8 MMOL/L — SIGNIFICANT CHANGE UP (ref 3.5–5.3)
PROT C ACT/NOR PPP: 76 % — SIGNIFICANT CHANGE UP (ref 74–150)
RBC # BLD: 4.02 M/UL — SIGNIFICANT CHANGE UP (ref 3.8–5.2)
RBC # FLD: 12.6 % — SIGNIFICANT CHANGE UP (ref 10.3–14.5)
SODIUM SERPL-SCNC: 136 MMOL/L — SIGNIFICANT CHANGE UP (ref 135–145)
WBC # BLD: 8.82 K/UL — SIGNIFICANT CHANGE UP (ref 3.8–10.5)
WBC # FLD AUTO: 8.82 K/UL — SIGNIFICANT CHANGE UP (ref 3.8–10.5)

## 2020-03-11 PROCEDURE — 99232 SBSQ HOSP IP/OBS MODERATE 35: CPT

## 2020-03-11 PROCEDURE — 93312 ECHO TRANSESOPHAGEAL: CPT | Mod: 26

## 2020-03-11 RX ORDER — POTASSIUM CHLORIDE 20 MEQ
20 PACKET (EA) ORAL ONCE
Refills: 0 | Status: DISCONTINUED | OUTPATIENT
Start: 2020-03-11 | End: 2020-03-11

## 2020-03-11 RX ORDER — POTASSIUM CHLORIDE 20 MEQ
10 PACKET (EA) ORAL
Refills: 0 | Status: COMPLETED | OUTPATIENT
Start: 2020-03-11 | End: 2020-03-11

## 2020-03-11 RX ORDER — ACETAMINOPHEN 500 MG
1000 TABLET ORAL ONCE
Refills: 0 | Status: COMPLETED | OUTPATIENT
Start: 2020-03-11 | End: 2020-03-11

## 2020-03-11 RX ORDER — MAGNESIUM SULFATE 500 MG/ML
1 VIAL (ML) INJECTION ONCE
Refills: 0 | Status: COMPLETED | OUTPATIENT
Start: 2020-03-11 | End: 2020-03-11

## 2020-03-11 RX ADMIN — Medication 100 MILLIEQUIVALENT(S): at 10:39

## 2020-03-11 RX ADMIN — APIXABAN 10 MILLIGRAM(S): 2.5 TABLET, FILM COATED ORAL at 05:52

## 2020-03-11 RX ADMIN — APIXABAN 10 MILLIGRAM(S): 2.5 TABLET, FILM COATED ORAL at 17:32

## 2020-03-11 RX ADMIN — Medication 100 MILLIEQUIVALENT(S): at 16:48

## 2020-03-11 RX ADMIN — Medication 1000 MILLIGRAM(S): at 22:52

## 2020-03-11 RX ADMIN — Medication 1000 MILLIGRAM(S): at 23:45

## 2020-03-11 RX ADMIN — Medication 100 MILLIEQUIVALENT(S): at 08:44

## 2020-03-11 RX ADMIN — Medication 100 GRAM(S): at 19:38

## 2020-03-11 NOTE — PROGRESS NOTE ADULT - ASSESSMENT
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     - echo with bubble study demonstrates possibility of intracardiac shunt vs. pulmonary AV malformation, please f/u ROBIN, f/u cardiology recommendations for further evaluation of possible PFO   - Please consult Hem/onc for hypercoagulability workup  - Recommend started pt on Eliquis 10 mg BID X1week, then Eliquis 5 mg BIDx3 weeks for a total of one month of anticoagulation  - d/w chief resident and Dr. 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 Guillen for further evaluation  - F/u cardiology recommendations  - Please consult Hem/onc for hypercoagulability workup  - Recommend continue on Eliquis 10 mg BID X1week, then Eliquis 5 mg BIDx3 weeks for a total of one month of anticoagulation  - d/w  Dr. 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 Guillen for further evaluation  - F/u cardiology recommendations  - Please consult Hem/onc for hypercoagulability workup  - Recommend continue on Eliquis 10 mg BID X1week, then Eliquis 5 mg BIDx3 weeks for a total of one month of anticoagulation  - Vascular surgery to sign off, please reconsult as needed  -Discussed w/ chief resident

## 2020-03-11 NOTE — PROGRESS NOTE ADULT - ASSESSMENT
30 yo F with no sigificant PMH presents with 1 day history of abdominal pain, CT concerning for splenic infarct current hemoglobin stable    NPO/IVF  Eliquis  Pain/nausea control prn  SCDs/IS/OOBA  AM labs  Heme consult

## 2020-03-11 NOTE — PROGRESS NOTE ADULT - SUBJECTIVE AND OBJECTIVE BOX
Pt AVSS, no acute events overnight, NPO for ROBIN today. Denies abdominal pain, denies N/V, denies lower extremity weakness, numbness. Denies chest pain/dyspnea/palpitations    apixaban 10 milliGRAM(s) Oral every 12 hours      Vital Signs Last 24 Hrs  T(C): 36.9 (11 Mar 2020 08:12), Max: 37.5 (11 Mar 2020 05:16)  T(F): 98.4 (11 Mar 2020 08:12), Max: 99.5 (11 Mar 2020 05:16)  HR: 75 (11 Mar 2020 08:12) (75 - 89)  BP: 113/72 (11 Mar 2020 08:12) (107/67 - 120/75)  BP(mean): --  RR: 17 (11 Mar 2020 08:12) (17 - 18)  SpO2: 98% (11 Mar 2020 08:12) (98% - 100%)    General: NAD, resting comfortably  Neuro: AAOX3, EOMI, PERRLA, no scleral icterus  Pulm: CTAB, Nonlabored breathing  CV: S1/S2 normal, no murmurs  Abdomen: soft, nondistended, minimal improved LUQ tenderness, no rebound, no guarding  Extremities: WWP, No LE edema b/l      LABS:                        12.2   8.82  )-----------( 211      ( 11 Mar 2020 06:21 )             35.8     03-11    136  |  103  |  3<L>  ----------------------------<  105<H>  3.8   |  23  |  0.79    Ca    8.7      11 Mar 2020 06:21  Phos  3.1     03-11  Mg     1.9     03-11      PT/INR - ( 09 Mar 2020 16:58 )   PT: 13.0 sec;   INR: 1.14          PTT - ( 09 Mar 2020 16:58 )  PTT:34.4 sec Pt AVSS, no acute events overnight, NPO for ROBIN today. Denies abdominal pain, denies N/V, denies lower extremity weakness, numbness. Denies chest pain/dyspnea/palpitations    apixaban 10 milliGRAM(s) Oral every 12 hours      Vital Signs Last 24 Hrs  T(C): 36.9 (11 Mar 2020 08:12), Max: 37.5 (11 Mar 2020 05:16)  T(F): 98.4 (11 Mar 2020 08:12), Max: 99.5 (11 Mar 2020 05:16)  HR: 75 (11 Mar 2020 08:12) (75 - 89)  BP: 113/72 (11 Mar 2020 08:12) (107/67 - 120/75)  BP(mean): --  RR: 17 (11 Mar 2020 08:12) (17 - 18)  SpO2: 98% (11 Mar 2020 08:12) (98% - 100%)    General: NAD, resting comfortably  Neuro: AAOX3, EOMI, PERRLA, no scleral icterus  Pulm: CTAB, Nonlabored breathing  CV: S1/S2 normal, no murmurs  Abdomen: soft, nondistended, minimal improved LUQ tenderness, no rebound, no guarding  Extremities: WWP, No LE edema b/l      LABS:                        12.2   8.82  )-----------( 211      ( 11 Mar 2020 06:21 )             35.8     03-11    136  |  103  |  3<L>  ----------------------------<  105<H>  3.8   |  23  |  0.79    Ca    8.7      11 Mar 2020 06:21  Phos  3.1     03-11  Mg     1.9     03-11      PT/INR - ( 09 Mar 2020 16:58 )   PT: 13.0 sec;   INR: 1.14          PTT - ( 09 Mar 2020 16:58 )  PTT:34.4 sec    < from: ROBIN Echo Doppler w/Cont (03.11.20 @ 13:06) >  CONCLUSIONS:     1. Normal left and right ventricular size and systolic function.   2. No LA/RA/GLADYS/RAA thrombus seen.   3. No significant valvular disease.   4. No evidence of pulmonary hypertension, pulmonary artery systolic pressure is 30 mmHg.   5. 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 Bubbles were seen in the left upper pulmonary vein.   6. No echocardiographic evidence of vegetations.   7. No pericardial effusion.    < end of copied text >

## 2020-03-11 NOTE — PROGRESS NOTE ADULT - SUBJECTIVE AND OBJECTIVE BOX
ROBIN performed, possible PFO vs small AVM  As per primary team, d/w radiology, no notable AVM on CT chest  She feels well and abdominal pain is 1-2/10    PAST MEDICAL & SURGICAL HISTORY:  No pertinent past medical history  No significant past surgical history      MEDICATIONS  (STANDING):  acetaminophen  IVPB .. 1000 milliGRAM(s) IV Intermittent once  apixaban 10 milliGRAM(s) Oral every 12 hours  magnesium sulfate  IVPB 1 Gram(s) IV Intermittent once      Vital Signs Last 24 Hrs  T(C): 36.9 (11 Mar 2020 16:14), Max: 37.5 (11 Mar 2020 05:16)  T(F): 98.5 (11 Mar 2020 16:14), Max: 99.5 (11 Mar 2020 05:16)  HR: 87 (11 Mar 2020 16:14) (75 - 89)  BP: 112/74 (11 Mar 2020 16:14) (107/67 - 115/70)  BP(mean): --  RR: 17 (11 Mar 2020 16:14) (17 - 18)  SpO2: 98% (11 Mar 2020 16:14) (98% - 100%)    Daily     Daily     Physical Exam:   GEN: NAD, AAOx3  HEENT: MMM, no icterus  CV: S1 S2 RRR, no MRG  Lung: CTAB  Ext: no c/c/e  Neuro: no focal neuro deficit      LABS:                        12.2   8.82  )-----------( 211      ( 11 Mar 2020 06:21 )             35.8     03-11    136  |  103  |  3<L>  ----------------------------<  105<H>  3.8   |  23  |  0.79    Ca    8.7      11 Mar 2020 06:21  Phos  3.1     03-11  Mg     1.9     03-11              I&O's Detail    10 Mar 2020 07:01  -  11 Mar 2020 07:00  --------------------------------------------------------  IN:    lactated ringers.: 1200 mL  Total IN: 1200 mL    OUT:    Voided: 2200 mL  Total OUT: 2200 mL    Total NET: -1000 mL      11 Mar 2020 07:01  -  11 Mar 2020 18:13  --------------------------------------------------------  IN:  Total IN: 0 mL    OUT:    Voided: 900 mL  Total OUT: 900 mL    Total NET: -900 mL              RADIOLOGY & ADDITIONAL STUDIES:

## 2020-03-11 NOTE — PROGRESS NOTE ADULT - SUBJECTIVE AND OBJECTIVE BOX
SUBJECTIVE: Feeling well, tolerating diet, +BF. Patient seen and examined bedside by chief resident.    apixaban 10 milliGRAM(s) Oral every 12 hours    MEDICATIONS  (PRN):  ondansetron Injectable 4 milliGRAM(s) IV Push every 6 hours PRN Nausea and/or Vomiting      I&O's Detail    10 Mar 2020 07:01  -  11 Mar 2020 07:00  --------------------------------------------------------  IN:    lactated ringers.: 1200 mL  Total IN: 1200 mL    OUT:    Voided: 2200 mL  Total OUT: 2200 mL    Total NET: -1000 mL          Vital Signs Last 24 Hrs  T(C): 37.5 (11 Mar 2020 05:16), Max: 37.5 (11 Mar 2020 05:16)  T(F): 99.5 (11 Mar 2020 05:16), Max: 99.5 (11 Mar 2020 05:16)  HR: 77 (11 Mar 2020 05:16) (74 - 89)  BP: 115/70 (11 Mar 2020 05:16) (107/67 - 120/75)  BP(mean): --  RR: 18 (11 Mar 2020 05:16) (16 - 18)  SpO2: 98% (11 Mar 2020 05:16) (98% - 100%)    General: NAD, resting comfortably in bed  C/V: NSR  Pulm: Nonlabored breathing, no respiratory distress  Abd: soft, NT/ND  Extrem: SCDs in place    LABS:                        12.2   8.82  )-----------( 211      ( 11 Mar 2020 06:21 )             35.8     03-11    136  |  103  |  3<L>  ----------------------------<  105<H>  3.8   |  23  |  0.79    Ca    8.7      11 Mar 2020 06:21  Phos  3.1     03-11  Mg     1.9     03-11      PT/INR - ( 09 Mar 2020 16:58 )   PT: 13.0 sec;   INR: 1.14          PTT - ( 09 Mar 2020 16:58 )  PTT:34.4 sec      RADIOLOGY & ADDITIONAL STUDIES:  CT Abdomen and Pelvis w/ Oral Cont and w/ IV Cont:   EXAM:  CT ABDOMEN AND PELVIS OC IC                           PROCEDURE DATE:  03/09/2020          INTERPRETATION:  CT of the ABDOMEN and PELVIS with intravenous contrast dated 3/9/2020 3:11 AM    INDICATION: fever 101 and upper abd pain    TECHNIQUE: CT of the abdomen and pelvis was performed using oral and intravenous contrast. Axial, sagittal and coronal images were produced and reviewed.    PRIOR STUDIES: None.    FINDINGS: Images of the lower chest demonstrate clear lung bases.    The liveris normal in appearance.  No radiopaque stones are seen in the gallbladder.  The pancreas is normal in appearance.  The spleen demonstrates a few wedge shaped areas of hypodensity suspicious for infarctions.     The adrenal glands are unremarkable. The kidneys are normal in appearance.        No abdominal aortic aneurysm is seen. No lymphadenopathy is seen.     Evaluation of the bowel demonstrates no obstruction or appreciable wall thickening. Normal appendix. No pneumoperitoneum. Tiny fat-containing umbilical hernia. No ascites is seen.    Images of the pelvis demonstrate the uterus and adnexae to be normal in appearance.  Tampon within the vagina. No filling defects in the bladder.    Evaluation of the osseous structures demonstrates no significant abnormality.      IMPRESSION:  A few wedge-shaped areas of hypodensity in the spleen suspicious for infarctions.                Thank you for the opportunity to participate in the care of this patient.        YNES ALSTON M.D., ATTENDING RADIOLOGIST  This document has been electronically signed.  Mar  9 2020  3:29AM               (03-09-20 @ 03:11)

## 2020-03-11 NOTE — PROGRESS NOTE ADULT - ASSESSMENT
30 yo F with no significant PMH, PSH of D&Cx2 presents w/ multiple splenic infarcts. Cardiology consulted for evaluation of possible Rt to Lt shunt.    Possible small PFO vs small pulmonary AVMs, infarcts unlikely cardioembolic  No additonal w/u recommended for shunt  Recommend hematology evaluation for thombophilia  We will sign off and recommend consult as needed    Patient seen and d/w Dr. Dodd

## 2020-03-12 ENCOUNTER — TRANSCRIPTION ENCOUNTER (OUTPATIENT)
Age: 30
End: 2020-03-12

## 2020-03-12 VITALS
RESPIRATION RATE: 17 BRPM | HEART RATE: 88 BPM | TEMPERATURE: 98 F | SYSTOLIC BLOOD PRESSURE: 100 MMHG | DIASTOLIC BLOOD PRESSURE: 58 MMHG | OXYGEN SATURATION: 100 %

## 2020-03-12 LAB
ANION GAP SERPL CALC-SCNC: 13 MMOL/L — SIGNIFICANT CHANGE UP (ref 5–17)
BUN SERPL-MCNC: 6 MG/DL — LOW (ref 7–23)
CALCIUM SERPL-MCNC: 9 MG/DL — SIGNIFICANT CHANGE UP (ref 8.4–10.5)
CHLORIDE SERPL-SCNC: 104 MMOL/L — SIGNIFICANT CHANGE UP (ref 96–108)
CO2 SERPL-SCNC: 20 MMOL/L — LOW (ref 22–31)
CREAT SERPL-MCNC: 0.77 MG/DL — SIGNIFICANT CHANGE UP (ref 0.5–1.3)
GLUCOSE SERPL-MCNC: 107 MG/DL — HIGH (ref 70–99)
HCT VFR BLD CALC: 39.2 % — SIGNIFICANT CHANGE UP (ref 34.5–45)
HGB BLD-MCNC: 13.3 G/DL — SIGNIFICANT CHANGE UP (ref 11.5–15.5)
MAGNESIUM SERPL-MCNC: 2 MG/DL — SIGNIFICANT CHANGE UP (ref 1.6–2.6)
MCHC RBC-ENTMCNC: 30.4 PG — SIGNIFICANT CHANGE UP (ref 27–34)
MCHC RBC-ENTMCNC: 33.9 GM/DL — SIGNIFICANT CHANGE UP (ref 32–36)
MCV RBC AUTO: 89.5 FL — SIGNIFICANT CHANGE UP (ref 80–100)
NRBC # BLD: 0 /100 WBCS — SIGNIFICANT CHANGE UP (ref 0–0)
PHOSPHATE SERPL-MCNC: 4.3 MG/DL — SIGNIFICANT CHANGE UP (ref 2.5–4.5)
PLATELET # BLD AUTO: 248 K/UL — SIGNIFICANT CHANGE UP (ref 150–400)
POTASSIUM SERPL-MCNC: 4 MMOL/L — SIGNIFICANT CHANGE UP (ref 3.5–5.3)
POTASSIUM SERPL-SCNC: 4 MMOL/L — SIGNIFICANT CHANGE UP (ref 3.5–5.3)
RBC # BLD: 4.38 M/UL — SIGNIFICANT CHANGE UP (ref 3.8–5.2)
RBC # FLD: 12.7 % — SIGNIFICANT CHANGE UP (ref 10.3–14.5)
SODIUM SERPL-SCNC: 137 MMOL/L — SIGNIFICANT CHANGE UP (ref 135–145)
WBC # BLD: 8.55 K/UL — SIGNIFICANT CHANGE UP (ref 3.8–10.5)
WBC # FLD AUTO: 8.55 K/UL — SIGNIFICANT CHANGE UP (ref 3.8–10.5)

## 2020-03-12 PROCEDURE — 83605 ASSAY OF LACTIC ACID: CPT

## 2020-03-12 PROCEDURE — 99232 SBSQ HOSP IP/OBS MODERATE 35: CPT

## 2020-03-12 PROCEDURE — 80048 BASIC METABOLIC PNL TOTAL CA: CPT

## 2020-03-12 PROCEDURE — 74177 CT ABD & PELVIS W/CONTRAST: CPT

## 2020-03-12 PROCEDURE — G0378: CPT

## 2020-03-12 PROCEDURE — 85610 PROTHROMBIN TIME: CPT

## 2020-03-12 PROCEDURE — 87040 BLOOD CULTURE FOR BACTERIA: CPT

## 2020-03-12 PROCEDURE — 81025 URINE PREGNANCY TEST: CPT

## 2020-03-12 PROCEDURE — 36415 COLL VENOUS BLD VENIPUNCTURE: CPT

## 2020-03-12 PROCEDURE — 80053 COMPREHEN METABOLIC PANEL: CPT

## 2020-03-12 PROCEDURE — 93307 TTE W/O DOPPLER COMPLETE: CPT

## 2020-03-12 PROCEDURE — 83735 ASSAY OF MAGNESIUM: CPT

## 2020-03-12 PROCEDURE — 74174 CTA ABD&PLVS W/CONTRAST: CPT

## 2020-03-12 PROCEDURE — 87631 RESP VIRUS 3-5 TARGETS: CPT

## 2020-03-12 PROCEDURE — 85025 COMPLETE CBC W/AUTO DIFF WBC: CPT

## 2020-03-12 PROCEDURE — C8925: CPT

## 2020-03-12 PROCEDURE — 71275 CT ANGIOGRAPHY CHEST: CPT

## 2020-03-12 PROCEDURE — 85303 CLOT INHIBIT PROT C ACTIVITY: CPT

## 2020-03-12 PROCEDURE — 93306 TTE W/DOPPLER COMPLETE: CPT

## 2020-03-12 PROCEDURE — 85027 COMPLETE CBC AUTOMATED: CPT

## 2020-03-12 PROCEDURE — 85730 THROMBOPLASTIN TIME PARTIAL: CPT

## 2020-03-12 PROCEDURE — 93005 ELECTROCARDIOGRAM TRACING: CPT

## 2020-03-12 PROCEDURE — 99285 EMERGENCY DEPT VISIT HI MDM: CPT | Mod: 25

## 2020-03-12 PROCEDURE — 87086 URINE CULTURE/COLONY COUNT: CPT

## 2020-03-12 PROCEDURE — 85300 ANTITHROMBIN III ACTIVITY: CPT

## 2020-03-12 PROCEDURE — 96374 THER/PROPH/DIAG INJ IV PUSH: CPT | Mod: XU

## 2020-03-12 PROCEDURE — 85220 BLOOC CLOT FACTOR V TEST: CPT

## 2020-03-12 PROCEDURE — 93970 EXTREMITY STUDY: CPT

## 2020-03-12 PROCEDURE — 81001 URINALYSIS AUTO W/SCOPE: CPT

## 2020-03-12 PROCEDURE — 84100 ASSAY OF PHOSPHORUS: CPT

## 2020-03-12 PROCEDURE — 96375 TX/PRO/DX INJ NEW DRUG ADDON: CPT

## 2020-03-12 PROCEDURE — 71045 X-RAY EXAM CHEST 1 VIEW: CPT

## 2020-03-12 PROCEDURE — 85306 CLOT INHIBIT PROT S FREE: CPT

## 2020-03-12 RX ORDER — APIXABAN 2.5 MG/1
1 TABLET, FILM COATED ORAL
Qty: 60 | Refills: 0
Start: 2020-03-12 | End: 2020-04-10

## 2020-03-12 RX ORDER — APIXABAN 2.5 MG/1
2 TABLET, FILM COATED ORAL
Qty: 16 | Refills: 0
Start: 2020-03-12 | End: 2020-03-15

## 2020-03-12 RX ADMIN — APIXABAN 10 MILLIGRAM(S): 2.5 TABLET, FILM COATED ORAL at 05:39

## 2020-03-12 NOTE — DISCHARGE NOTE PROVIDER - NSDCFUADDINST_GEN_ALL_CORE_FT
Please follow up with Dr. Smart to continue management of Eliquis, you have been prescribed 1 months supply. Please take 10mg twice a day for the next 4 days and then 5mg twice a day for the rest of the month.    Please follow up with Hematology for further workup, an appointment has been made with Dr. Alina Nuñez on March 24 at 11:30 AM; please bring insurance card and a referral from a PCP.    Please follow up with Dr. Bell in 1-2 weeks; you may call the office to make an appointment at your earliest convenience.     Contact your doctor or go to the ER for fever > 101.5, chills, nausea, vomiting, chest pain, shortness of breath, pain not controlled by medication or excessive bleeding.

## 2020-03-12 NOTE — PROGRESS NOTE ADULT - ASSESSMENT
28 yo F with no significant PMH presents with 1 day history of abdominal pain, CT concerning for splenic infarct current hemoglobin stable    Regular diet  Eliquis  Pain/nausea control prn  SCDs/IS/OOBA  AM labs

## 2020-03-12 NOTE — DISCHARGE NOTE PROVIDER - CARE PROVIDER_API CALL
Lewis Bell)  Surgery  [address] [address]  Phone: [phone]  Fax: [phone]  Follow Up Time:     Rashi Smart)  [facility] Surgery  Vascular  [address] [place] [address]  Phone: [phone]  Fax: [phone]  Follow Up Time:     Alina Nuñez)  HematologyOncology; Internal Medicine  [address] [address]  Phone: [phone]  Fax: [phone]  Follow Up Time:

## 2020-03-12 NOTE — DISCHARGE NOTE PROVIDER - NSDCMRMEDTOKEN_GEN_ALL_CORE_FT
apixaban 5 mg oral tablet: 2 tab(s) orally 2 times a day   Eliquis 5 mg oral tablet: 1 tab(s) orally 2 times a day

## 2020-03-12 NOTE — DISCHARGE NOTE PROVIDER - CARE PROVIDERS DIRECT ADDRESSES
[email or web address]_Unknown

## 2020-03-12 NOTE — DISCHARGE NOTE PROVIDER - PROVIDER TOKENS
PROVIDER:[TOKEN:[[id number]]],PROVIDER:[TOKEN:[[id number]]],PROVIDER:[TOKEN:[[id number]]]

## 2020-03-12 NOTE — CONSULT NOTE ADULT - ASSESSMENT
28 yo F with no significant PMH, PSH of D&Cx2 presents with A few wedge-shaped areas of hypodensity in the spleen suspicious for infarctions    - obtain cta chest/abd/pelvis  - LE venous duplex  - echo with bubble study  - hypercoagulable work up  - no anticoagulation for now until we figure out the source  - d/w Dr. Smart
30 yo F with no significant PMH, PSH of D&Cx2 presents w/ multiple splenic infarcts. Cardiology consulted for positive echo w/ bubble study.    Splenic infarcts noted  Small pericardial effusion and normal LV function on echo  Echo shows +bubble study after 3 beats, AVM vs PFO  NPO at midnight, will consider ROBIN to r/o cardioembolic source  Consider hematology consult for thrombogenic w/u    We will continue to follow  Patient to be seen and discussed w/ Dr. Dodd
29 year old  Female with no prior past medical history admitted for abdominal pain and subjective fever. CT imaging shows evidence of multiple splenic infarctions.     #Splenic infarctions  Etiology typically is cardiogenic vs infectious vs thrombophilic. Per cardiology workup, small PFO vs small pulmonary AVM's noted on 2D ECHO and bubble study however they do not believe source of infarctions are embolic in nature.    -Recommend hyperocoaguable workup including factor V leiden, prothrombin gene mutation, evaluation for lupus anticoagulant with DRVVT and hexagonal phase assay, beta-2-glycoprotein antibodies, anticardiolipin antibodies, protein C and S deficiency, and ATIII deficienc   -Recommend evaluation for MPN with JAK2 mutation analysis  -No evidence of prior history of sickle cell disease and CBC unremarkable  -We can consider workup for underlying PNH however no evidence of cytopenias and therefore less likely. We can consider doing this as outpatient  -Given fevers, would recommend evaluating for infectious mono with monospot test  -Agree with Eliquis  -Appointment made with Dr. Alina uNñez on March 24 at 11:30 AM for follow up; please have patient bring insurance card and a referral from a PCP    Case d/w Dr. May and recommendations relayed to primary medical team

## 2020-03-12 NOTE — DISCHARGE NOTE PROVIDER - HOSPITAL COURSE
30 yo F with no sigificant PMH presents with 1 day history of abdominal pain, 2/2 splenic infarct. Patient was worked up by Cardiology, Vascular Surgery and Hematology, She was found on ROBIN to have possible small pulmonary AV malformation for which Dr. Guillen was called. Patient will continue anticoagulation and follow up as an outpatient. Patient's hospital course was uncomplicated. Diet was advanced as tolerated and pain was well controlled on medication. On day of discharge, pt deemed stable and ready to return home with plan to follow up as an outpatient.

## 2020-03-12 NOTE — CONSULT NOTE ADULT - SUBJECTIVE AND OBJECTIVE BOX
Hematology Oncology Consult Note (Dr. May )  Discussed with  and recommendations reviewed with the primary team.    The patient was seen and examined      29 year old A  .      The patient denies chest pain or SOB.  No nausea/vomiting/fevers/chills/night sweats.  No fatigue, headaches/dizziness.  Appetite is stable without weight loss.  No abdominal pain/diarrhea/constipation.  No melena or hematochezia.    No dysuria/hematuria.  No history of easy bruising/bleeding.  No gingival bleeding or epistaxis.  No leg pain or leg swelling.    ROS is otherwise negative.    HEALTH MAINTENANCE:  Breast: last mammogram was performed on ....    Cervical: last pap smear was performed on ....  Colon: last colonoscopy was performed on ....  Prostate: last PSA was ....  Lung: a low dose helical CT was performed on....for RF's including x ppy cigs and current smoker/quit <15 yrs ago       PAST MEDICAL & SURGICAL HISTORY:  No pertinent past medical history  No significant past surgical history      Allergies:      Medications:  apixaban 10 milliGRAM(s) Oral every 12 hours        Social History:    FAMILY HISTORY:      PHYSICAL EXAM:    T(F): 98.4 (03-12-20 @ 08:05), Max: 100.8 (03-11-20 @ 22:44)  HR: 87 (03-12-20 @ 08:05) (75 - 87)  BP: 98/63 (03-12-20 @ 08:05) (98/63 - 120/74)  RR: 16 (03-12-20 @ 08:05) (16 - 18)  SpO2: 96% (03-12-20 @ 08:05) (96% - 99%)  Wt(kg): --    Daily     Daily     Gen: well developed, well nourished, comfortable  HEENT: normocephalic/atraumatic, no conjunctival pallor, no scleral icterus, no oral thrush/mucosal bleeding/mucositis  Neck: supple, no masses, no JVD  Cardiovascular: RR, nl S1S2, no murmurs/rubs/gallops  Respiratory: clear air entry b/l  Gastrointestinal: BS+, soft, NT/ND, no masses, no splenomegaly, no hepatomegaly, no evidence for ascites  Extremities: no clubbing/cyanosis, no edema, no calf tenderness  Vascular:  DP/PT 2+ b/l  Neurological: CN 2-12 grossly intact, no focal deficits  Skin: no rash on visible skin  Lymph Nodes:  no cervical/supraclavicular LAD, no axillary/groin LAD  Musculoskeletal:  full ROM  Psychiatric:  mood stable            Labs:                          13.3   8.55  )-----------( 248      ( 12 Mar 2020 06:29 )             39.2     CBC Full  -  ( 12 Mar 2020 06:29 )  WBC Count : 8.55 K/uL  RBC Count : 4.38 M/uL  Hemoglobin : 13.3 g/dL  Hematocrit : 39.2 %  Platelet Count - Automated : 248 K/uL  Mean Cell Volume : 89.5 fl  Mean Cell Hemoglobin : 30.4 pg  Mean Cell Hemoglobin Concentration : 33.9 gm/dL  Auto Neutrophil # : x  Auto Lymphocyte # : x  Auto Monocyte # : x  Auto Eosinophil # : x  Auto Basophil # : x  Auto Neutrophil % : x  Auto Lymphocyte % : x  Auto Monocyte % : x  Auto Eosinophil % : x  Auto Basophil % : x        03-12    137  |  104  |  6<L>  ----------------------------<  107<H>  4.0   |  20<L>  |  0.77    Ca    9.0      12 Mar 2020 06:29  Phos  4.3     03-12  Mg     2.0     03-12            Other Labs:    Cultures:    Pathology:    Imaging Studies: Hematology Oncology Consult Note (Dr. May )  Discussed with  and recommendations reviewed with the primary team.    The patient was seen and examined      29 year old  Female with no prior past medical history admitted with 1 day history of abdominal pain and subjective fever. CT imaging revealed multiple splenic infarctions. Cardiology workup shows  small PFO vs small pulmonary AVMs from 2D ECHO and bubble study. Patient states abdominal pain has improved, temp of 100.8 noted during admission. She denies previous history of VTE and denies family history of blood disorders or clotting disorders. She denies being told she has sickle cell disease or any other hemoglobinopathy. CBC unremarkable.   .    HEALTH MAINTENANCE:  Breast: last mammogram was performed on ....    Cervical: last pap smear was performed on ....  Colon: last colonoscopy was performed on ....  Prostate: last PSA was ....  Lung: a low dose helical CT was performed on....for RF's including x ppy cigs and current smoker/quit <15 yrs ago       PAST MEDICAL & SURGICAL HISTORY:  No pertinent past medical history  No significant past surgical history      Allergies:      Medications:  apixaban 10 milliGRAM(s) Oral every 12 hours    FAMILY HISTORY:  Noncontributory    PHYSICAL EXAM:    T(F): 98.4 (03-12-20 @ 08:05), Max: 100.8 (03-11-20 @ 22:44)  HR: 87 (03-12-20 @ 08:05) (75 - 87)  BP: 98/63 (03-12-20 @ 08:05) (98/63 - 120/74)  RR: 16 (03-12-20 @ 08:05) (16 - 18)  SpO2: 96% (03-12-20 @ 08:05) (96% - 99%)  Wt(kg): --    Daily     Daily     Gen: well developed, well nourished, comfortable  HEENT: normocephalic/atraumatic, no conjunctival pallor, no scleral icterus, no oral thrush/mucosal bleeding/mucositis  Neck: supple, no masses, no JVD  Cardiovascular: RR, nl S1S2, no murmurs/rubs/gallops  Respiratory: clear air entry b/l  Gastrointestinal: BS+, soft, NT/ND, no splenomegaly   Extremities: no clubbing/cyanosis, no edema, no calf tenderness  Vascular:  DP/PT 2+ b/l  Neurological: CN 2-12 grossly intact, no focal deficits      Labs:                          13.3   8.55  )-----------( 248      ( 12 Mar 2020 06:29 )             39.2     CBC Full  -  ( 12 Mar 2020 06:29 )  WBC Count : 8.55 K/uL  RBC Count : 4.38 M/uL  Hemoglobin : 13.3 g/dL  Hematocrit : 39.2 %  Platelet Count - Automated : 248 K/uL  Mean Cell Volume : 89.5 fl  Mean Cell Hemoglobin : 30.4 pg  Mean Cell Hemoglobin Concentration : 33.9 gm/dL  Auto Neutrophil # : x  Auto Lymphocyte # : x  Auto Monocyte # : x  Auto Eosinophil # : x  Auto Basophil # : x  Auto Neutrophil % : x  Auto Lymphocyte % : x  Auto Monocyte % : x  Auto Eosinophil % : x  Auto Basophil % : x        03-12    137  |  104  |  6<L>  ----------------------------<  107<H>  4.0   |  20<L>  |  0.77    Ca    9.0      12 Mar 2020 06:29  Phos  4.3     03-12  Mg     2.0     03-12            Other Labs:    Cultures:    Pathology:    Imaging Studies:  < from: CT Angio Chest w/ IV Cont (03.09.20 @ 15:27) >    EXAM:  CT ANGIO CHEST (W)AW IC                          EXAM:  CT ANGIO ABD PELV (W)AW IC                          PROCEDURE DATE:  03/09/2020          INTERPRETATION:  CT SCAN OF CHEST, ABDOMEN AND PELVIS    History: 28yo F with splenic infarcts.    Technique: CTA (CT ANGIOGRAPHY) of the chest, abdomen and pelvis was then performed using intravenous contrast. Multiplanar images of the chest, abdomen and pelvis were reconstructed on an independent work-station. Image postprocessing including production of axial, coronal and sagittal multiplanar reformatted images and coronal and sagittal maximum intensity projections (MIPs).     Comparison: CT abdomen pelvis from 3/9/2020 at 3:02 AM.    Findings:     Lungs and large airways: 0.5 cm left upper lobe pulmonary nodule on image 309 series 6, branching left upper lobe pulmonary nodules on image 325 series 6 consistent with small airway disease.    Pleura:  No pleural effusion.    Mediastinum and hilar regions: No thoracic lymphadenopathy.    Heart and pericardium:  Heart size is normal. No pericardial effusion.    Vessels:  Unremarkable. No evidence of aortic aneurysm, aortic dissection, aortic ulcerated plaques or aortic filling defects. No filling defects seen in the SMA, celiac axis, splenic artery, KIM.     Chest wall and lower neck:  Unremarkable.    Liver:  Unremarkable. Heterogeneous enhancement consistent with arterial phase imaging.    Gallbladder: No radiopaque stones gallbladder.    Spleen:  Again seen are multiple wedge-shaped areas of hypodensity within the spleen compatible with multiple splenic infarcts. There has been an apparent increase in the size and number of the splenic infarcts, however this is study was performed in the arterial phase and the prior study inthe portal venous phase, so it is somewhat difficult to make accurate comparisons of the splenic enhancement between the 2 studies.    Pancreas:  Unremarkable.    Adrenal glands:  Unremarkable.    Kidneys: Unremarkable.    Abdominal and pelvic adenopathy:  No lymphadenopathy in abdomen or pelvis.    Ascites: None.    Gastrointestinal tract: Evaluation of the GI tract shows oral contrast to the level of the rectum. This contrast is residual from prior CT from 3/9/2020. There is no appreciable luminal dilation or bowel wall thickening. The appendix is normal    Pelvic organs: Within normal limits.    Soft tissues: Within normal limits.    Bones: Within normal limits.    Impression:   Multiple splenic infarcts again noted, with an apparent increase in size and number when compared to the prior CT.       < end of copied text >

## 2020-03-12 NOTE — DISCHARGE NOTE NURSING/CASE MANAGEMENT/SOCIAL WORK - PATIENT PORTAL LINK FT
You can access the FollowMyHealth Patient Portal offered by [facility] by registering at the following website: [email or web address]. By joining [employer]’s FollowMyHealth portal, you will also be able to view your health information using other applications (apps) compatible with our system.

## 2020-03-13 LAB — PROT S FREE PPP-ACNC: 70 % NORMAL — SIGNIFICANT CHANGE UP (ref 60–140)

## 2020-03-14 LAB
CULTURE RESULTS: SIGNIFICANT CHANGE UP
CULTURE RESULTS: SIGNIFICANT CHANGE UP
PROT S FREE PPP-ACNC: 43 % NORMAL — LOW (ref 60–140)
SPECIMEN SOURCE: SIGNIFICANT CHANGE UP
SPECIMEN SOURCE: SIGNIFICANT CHANGE UP

## 2020-03-17 DIAGNOSIS — Z82.49 FAMILY HISTORY OF ISCHEMIC HEART DISEASE AND OTHER DISEASES OF THE CIRCULATORY SYSTEM: ICD-10-CM

## 2020-03-17 DIAGNOSIS — D73.5 INFARCTION OF SPLEEN: ICD-10-CM

## 2020-03-17 DIAGNOSIS — R10.9 UNSPECIFIED ABDOMINAL PAIN: ICD-10-CM

## 2020-03-17 DIAGNOSIS — Q25.72 CONGENITAL PULMONARY ARTERIOVENOUS MALFORMATION: ICD-10-CM

## 2020-03-17 DIAGNOSIS — K42.9 UMBILICAL HERNIA WITHOUT OBSTRUCTION OR GANGRENE: ICD-10-CM

## 2020-07-16 ENCOUNTER — RESULT REVIEW (OUTPATIENT)
Age: 30
End: 2020-07-16

## 2020-07-18 PROBLEM — Z00.00 ENCOUNTER FOR PREVENTIVE HEALTH EXAMINATION: Status: ACTIVE | Noted: 2020-07-18

## 2020-07-23 ENCOUNTER — APPOINTMENT (OUTPATIENT)
Dept: VASCULAR SURGERY | Facility: CLINIC | Age: 30
End: 2020-07-23
Payer: COMMERCIAL

## 2020-07-23 PROCEDURE — 99214 OFFICE O/P EST MOD 30 MIN: CPT

## 2020-07-24 NOTE — ASSESSMENT
[FreeTextEntry1] : 30 yo F with no significant PMH presented to the ED on 3/9/2020 with abdominal pain, she was found to have splenic infarct, ROBIN found pulmonary AV malformation, CT scan did not show obvious signs of AVM. Splenic infarct was unprovoked, negative hypercoagulable workup. splenic artery infarct was unprovoked, prior to discontinuing AC recommend pulmonary and visceral angiogram. Discussed with Dr. Guillen, we will get pre op and his office will schedule procedure.

## 2020-07-24 NOTE — HISTORY OF PRESENT ILLNESS
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 Patient was worked up by Cardiology, Vascular Surgery and Hematology, She was found on ROBIN to have possible small pulmonary AV malformation vs PFO. CT scan did not show obvious AVM. Cardiology did not believe that symptoms were cardioembolic. Since her discharge she has some very mild pain in the left abdomen, about 2/10, comes and goes. \par \par She is followed by her hematologist Dr. Nuñez who is planning on taking her off the AC in September, still doing hypercoagulable workup, so far everything is negative per patient but wanted to make sure no intervention was needed prior to stopping AC. \par \par She denies trauma, recent travel, -COVID, not on OCP, non smoker, no personal or family history of DVT.

## 2020-07-24 NOTE — PHYSICAL EXAM
[Respiratory Effort] : normal respiratory effort [Normal Heart Sounds] : normal heart sounds [de-identified] : well appearing

## 2020-07-27 ENCOUNTER — LABORATORY RESULT (OUTPATIENT)
Age: 30
End: 2020-07-27

## 2020-07-28 VITALS
HEIGHT: 65 IN | SYSTOLIC BLOOD PRESSURE: 111 MMHG | RESPIRATION RATE: 16 BRPM | OXYGEN SATURATION: 98 % | WEIGHT: 184.09 LBS | DIASTOLIC BLOOD PRESSURE: 62 MMHG | TEMPERATURE: 98 F | HEART RATE: 54 BPM

## 2020-07-28 RX ORDER — CHLORHEXIDINE GLUCONATE 213 G/1000ML
1 SOLUTION TOPICAL ONCE
Refills: 0 | Status: DISCONTINUED | OUTPATIENT
Start: 2020-07-30 | End: 2020-07-30

## 2020-07-28 NOTE — H&P ADULT - ASSESSMENT
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 Pt was seen by cardiology, vascular surgery and hematology during the admission. ROBIN 3/9/20 revealed possible small pulmonary AV malformation vs PFO for which Dr. Guillen was consulted. CT scan did not show obvious AVM. Cardiology did not believe that symptoms were cardioembolic. Since discharge from hospital, pt endorses intermittent pain to L abdomen described as "twinge" that comes and goes, occurring independent of exertion and of 2/10 intensity, lasting few seconds before self-resolving x 4 months. Pt also endorses intermittent palpitations described as "irregular beat" occurring independent of exertion, lasting second before resolving, over past few years. Last episode couple weeks ago.   Patient has been following with her hematologist, Dr. Nuñez, who was planning on discontinuing the Eliquis in September 2020, currently undergoing hypercoaguable workup and everything is negative thus far as per pt; however, team wanted to make sure no intervention was needed prior to stopping AC.  Denies CP, SOB, dizziness, syncope, N/V, BRBPR, melena, epistaxis. In light of recent splenic infarct, negative hypercoagulable w/u thus far per pt, pt referred for pulmonary and visceral angiogram with Dr. Guillen prior to discontinuation of AC with Dr. Guillen.    Last took Eliquis 7/29/20 PM. Alerted Dr. Guillen's team who are OK to proceed.     ASA Class III    Mallampati Class III    Risks & benefits of procedure and alternative therapy have been explained to the patient including but not limited to: allergic reaction, bleeding with possible need for blood transfusion, infection, renal and vascular compromise, limb damage, arrhythmia, stroke, vessel dissection/perforation, Myocardial infarction, emergent CABG. Informed consent obtained and in chart.       Patient a candidate for sedation: Yes    Sedation Type: Moderate    Dr. Guillen Procedures:  Risks & benefits of procedure and alternative therapy will be explained to the patient by Dr. Guillen's team including but not limited to: allergic reaction, bleeding with possible need for blood transfusion, infection, renal and vascular compromise, limb damage, emergent surgery. Informed consent obtained and in chart. 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 Pt was seen by cardiology, vascular surgery and hematology during the admission. ROBIN 3/9/20 revealed possible small pulmonary AV malformation vs PFO for which Dr. Guillen was consulted. CT scan did not show obvious AVM. Cardiology did not believe that symptoms were cardioembolic. Since discharge from hospital, pt endorses intermittent pain to L abdomen described as "twinge" that comes and goes, occurring independent of exertion and of 2/10 intensity, lasting few seconds before self-resolving x 4 months. Pt also endorses intermittent palpitations described as "irregular beat" occurring independent of exertion, lasting second before resolving, over past few years. Last episode couple weeks ago.   Patient has been following with her hematologist, Dr. Nuñez, who was planning on discontinuing the Eliquis in September 2020, currently undergoing hypercoaguable workup and everything is negative thus far as per pt; however, team wanted to make sure no intervention was needed prior to stopping AC.  Denies CP, SOB, dizziness, syncope, N/V, BRBPR, melena, epistaxis. In light of recent splenic infarct, negative hypercoagulable w/u thus far per pt, pt referred for pulmonary and visceral angiogram with Dr. Guillen prior to discontinuation of AC with Dr. Guillen.    Last took Eliquis 7/29/20 PM. Alerted Dr. Guillen's team who are OK to proceed.     bhcg negative.     ASA Class III    Mallampati Class III    Risks & benefits of procedure and alternative therapy have been explained to the patient including but not limited to: allergic reaction, bleeding with possible need for blood transfusion, infection, renal and vascular compromise, limb damage, arrhythmia, stroke, vessel dissection/perforation, Myocardial infarction, emergent CABG. Informed consent obtained and in chart.       Patient a candidate for sedation: Yes    Sedation Type: Moderate    Dr. Guillen Procedures:  Risks & benefits of procedure and alternative therapy will be explained to the patient by Dr. Guillen's team including but not limited to: allergic reaction, bleeding with possible need for blood transfusion, infection, renal and vascular compromise, limb damage, emergent surgery. Informed consent obtained and in chart.

## 2020-07-28 NOTE — H&P ADULT - HISTORY OF PRESENT ILLNESS
SKELETON  COVID test   Escort:  Pharmacy:    31 yo F with PMHx of pulmonary AV malformation who initially presented to the [facility] ED on 3/9/20 with reports of abdominal pain and found to have a splenic infarct. Pt was seen by cardiology, vascular surgery and hematology during the admission. Pt had a ROBIN 3/9/20 revealing a possible small pulmonary AV malformation for which Dr. Guillen was consulted. CT scan did not show obvious AVM. Pt was advised to continue anticoagulation and to follow up with vascular surgery as out-pt. Pt is referred to [facility] for pulmonary angiogram with Dr. Guillen. 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 Pt was seen by cardiology, vascular surgery and hematology during the admission. ROBIN 3/9/20 revealed possible small pulmonary AV malformation vs PFO for which Dr. Guillen was consulted. CT scan did not show obvious AVM. Cardiology did not believe that symptoms were cardioembolic. Since discharge from hospital, pt endorses intermittent pain to L abdomen that comes and goes of 2/10 intensity.  Patient has been following with her hematologist, Dr. Nuñez, who was planning on discontinuing the Eliquis in September 2020, currently undergoing hypercoaguable workup and everything is negative thus far as per pt; however, team wanted to make sure no intervention was needed prior to stopping AC. ROBIN 3/11/20: normal LV and RV size and systolic function, no regional WMA, no LA/RA/GLADYS/RAA thrombus seen, no significant valvular disease, no e/o pulm HTN (PASP 30 mmHg), injection with agitated saline reveals late bubbles (after 5 heart beats) in L heart most c/w small pulmonary AV malformation, bubbles seen in L upper pulm vein).  In light of recent splenic infarct, negative hypercoagulable w/u thus far per pt, pt referred for pulmonary and visceral angiogram with Dr. Guillen prior to discontinuation of AC with Dr. uGillen. 29 yo F with FHx HOCM (mom s/p ICD implant) without significant PMHx who initially presented to the 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 Pt was seen by cardiology, vascular surgery and hematology during the admission. ROBIN 3/9/20 revealed possible small pulmonary AV malformation vs PFO for which Dr. Guillen was consulted. CT scan did not show obvious AVM. Cardiology did not believe that symptoms were cardioembolic. Since discharge from hospital, pt endorses intermittent pain to L abdomen described as "twinge" that comes and goes, occurring independent of exertion and of 2/10 intensity, lasting few seconds before self-resolving x 4 months. Pt also endorses intermittent palpitations described as "irregular beat" occurring independent of exertion, lasting second before resolving, over past few years. Last episode couple weeks ago.   Patient has been following with her hematologist, Dr. Nuñez, who was planning on discontinuing the Eliquis in September 2020, currently undergoing hypercoaguable workup and everything is negative thus far as per pt; however, team wanted to make sure no intervention was needed prior to stopping AC.  Denies CP, SOB, dizziness, syncope, N/V, BRBPR, melena, epistaxis. ROBIN 3/11/20: normal LV and RV size and systolic function, no regional WMA, no LA/RA/GLADYS/RAA thrombus seen, no significant valvular disease, no e/o pulm HTN (PASP 30 mmHg), injection with agitated saline reveals late bubbles (after 5 heart beats) in L heart most c/w small pulmonary AV malformation, bubbles seen in L upper pulm vein).  In light of recent splenic infarct, negative hypercoagulable w/u thus far per pt, pt referred for pulmonary and visceral angiogram with Dr. Guillen prior to discontinuation of AC with Dr. Guillen.

## 2020-07-28 NOTE — H&P ADULT - NSHPLABSRESULTS_GEN_ALL_CORE
Statement Selected EKG: EKG: SB @ 52 with sinus arrhythmia, no ST/T segment changes EKG: SB @ 52 with sinus arrhythmia, no ST/T segment changes                          13.0   3.93  )-----------( 265      ( 30 Jul 2020 09:52 )             38.9       07-30    139  |  104  |  12  ----------------------------<  86  3.7   |  22  |  0.78    Ca    9.6      30 Jul 2020 09:52    TPro  7.4  /  Alb  4.4  /  TBili  0.3  /  DBili  x   /  AST  20  /  ALT  13  /  AlkPhos  73  07-30      PT/INR - ( 30 Jul 2020 09:52 )   PT: 14.2 sec;   INR: 1.19          PTT - ( 30 Jul 2020 09:52 )  PTT:41.9 sec

## 2020-07-28 NOTE — H&P ADULT - NSICDXFAMILYHX_GEN_ALL_CORE_FT
No pertinent family history in first degree relatives FAMILY HISTORY:  Family history of hypertrophic cardiomyopathy, Mother with HOCM s/p ICD implant

## 2020-07-30 ENCOUNTER — OUTPATIENT (OUTPATIENT)
Dept: OUTPATIENT SERVICES | Facility: HOSPITAL | Age: 30
LOS: 1 days | Discharge: NURSING, REHAB OR LONG-TERM CARE | End: 2020-07-30
Payer: COMMERCIAL

## 2020-07-30 LAB
ALBUMIN SERPL ELPH-MCNC: 4.4 G/DL — SIGNIFICANT CHANGE UP (ref 3.3–5)
ALP SERPL-CCNC: 73 U/L — SIGNIFICANT CHANGE UP (ref 40–120)
ALT FLD-CCNC: 13 U/L — SIGNIFICANT CHANGE UP (ref 10–45)
ANION GAP SERPL CALC-SCNC: 13 MMOL/L — SIGNIFICANT CHANGE UP (ref 5–17)
APTT BLD: 41.9 SEC — HIGH (ref 27.5–35.5)
AST SERPL-CCNC: 20 U/L — SIGNIFICANT CHANGE UP (ref 10–40)
BASOPHILS # BLD AUTO: 0.02 K/UL — SIGNIFICANT CHANGE UP (ref 0–0.2)
BASOPHILS NFR BLD AUTO: 0.5 % — SIGNIFICANT CHANGE UP (ref 0–2)
BILIRUB SERPL-MCNC: 0.3 MG/DL — SIGNIFICANT CHANGE UP (ref 0.2–1.2)
BUN SERPL-MCNC: 12 MG/DL — SIGNIFICANT CHANGE UP (ref 7–23)
CALCIUM SERPL-MCNC: 9.6 MG/DL — SIGNIFICANT CHANGE UP (ref 8.4–10.5)
CHLORIDE SERPL-SCNC: 104 MMOL/L — SIGNIFICANT CHANGE UP (ref 96–108)
CO2 SERPL-SCNC: 22 MMOL/L — SIGNIFICANT CHANGE UP (ref 22–31)
CREAT SERPL-MCNC: 0.78 MG/DL — SIGNIFICANT CHANGE UP (ref 0.5–1.3)
EOSINOPHIL # BLD AUTO: 0.1 K/UL — SIGNIFICANT CHANGE UP (ref 0–0.5)
EOSINOPHIL NFR BLD AUTO: 2.5 % — SIGNIFICANT CHANGE UP (ref 0–6)
GLUCOSE SERPL-MCNC: 86 MG/DL — SIGNIFICANT CHANGE UP (ref 70–99)
HCG SERPL-ACNC: <0 MIU/ML — SIGNIFICANT CHANGE UP
HCT VFR BLD CALC: 38.9 % — SIGNIFICANT CHANGE UP (ref 34.5–45)
HGB BLD-MCNC: 13 G/DL — SIGNIFICANT CHANGE UP (ref 11.5–15.5)
IMM GRANULOCYTES NFR BLD AUTO: 0 % — SIGNIFICANT CHANGE UP (ref 0–1.5)
INR BLD: 1.19 — HIGH (ref 0.88–1.16)
LYMPHOCYTES # BLD AUTO: 2.1 K/UL — SIGNIFICANT CHANGE UP (ref 1–3.3)
LYMPHOCYTES # BLD AUTO: 53.4 % — HIGH (ref 13–44)
MCHC RBC-ENTMCNC: 30.2 PG — SIGNIFICANT CHANGE UP (ref 27–34)
MCHC RBC-ENTMCNC: 33.4 GM/DL — SIGNIFICANT CHANGE UP (ref 32–36)
MCV RBC AUTO: 90.3 FL — SIGNIFICANT CHANGE UP (ref 80–100)
MONOCYTES # BLD AUTO: 0.4 K/UL — SIGNIFICANT CHANGE UP (ref 0–0.9)
MONOCYTES NFR BLD AUTO: 10.2 % — SIGNIFICANT CHANGE UP (ref 2–14)
NEUTROPHILS # BLD AUTO: 1.31 K/UL — LOW (ref 1.8–7.4)
NEUTROPHILS NFR BLD AUTO: 33.4 % — LOW (ref 43–77)
NRBC # BLD: 0 /100 WBCS — SIGNIFICANT CHANGE UP (ref 0–0)
PLATELET # BLD AUTO: 265 K/UL — SIGNIFICANT CHANGE UP (ref 150–400)
POTASSIUM SERPL-MCNC: 3.7 MMOL/L — SIGNIFICANT CHANGE UP (ref 3.5–5.3)
POTASSIUM SERPL-SCNC: 3.7 MMOL/L — SIGNIFICANT CHANGE UP (ref 3.5–5.3)
PROT SERPL-MCNC: 7.4 G/DL — SIGNIFICANT CHANGE UP (ref 6–8.3)
PROTHROM AB SERPL-ACNC: 14.2 SEC — HIGH (ref 10.6–13.6)
RBC # BLD: 4.31 M/UL — SIGNIFICANT CHANGE UP (ref 3.8–5.2)
RBC # FLD: 12.5 % — SIGNIFICANT CHANGE UP (ref 10.3–14.5)
SODIUM SERPL-SCNC: 139 MMOL/L — SIGNIFICANT CHANGE UP (ref 135–145)
WBC # BLD: 3.93 K/UL — SIGNIFICANT CHANGE UP (ref 3.8–10.5)
WBC # FLD AUTO: 3.93 K/UL — SIGNIFICANT CHANGE UP (ref 3.8–10.5)

## 2020-07-30 PROCEDURE — C1887: CPT

## 2020-07-30 PROCEDURE — 36245 INS CATH ABD/L-EXT ART 1ST: CPT

## 2020-07-30 PROCEDURE — 85025 COMPLETE CBC W/AUTO DIFF WBC: CPT

## 2020-07-30 PROCEDURE — 75743 ARTERY X-RAYS LUNGS: CPT | Mod: 26

## 2020-07-30 PROCEDURE — 36415 COLL VENOUS BLD VENIPUNCTURE: CPT

## 2020-07-30 PROCEDURE — 36014 PLACE CATHETER IN ARTERY: CPT | Mod: RT

## 2020-07-30 PROCEDURE — C1769: CPT

## 2020-07-30 PROCEDURE — 80053 COMPREHEN METABOLIC PANEL: CPT

## 2020-07-30 PROCEDURE — 75726 ARTERY X-RAYS ABDOMEN: CPT | Mod: 26

## 2020-07-30 PROCEDURE — 84702 CHORIONIC GONADOTROPIN TEST: CPT

## 2020-07-30 PROCEDURE — 85610 PROTHROMBIN TIME: CPT

## 2020-07-30 PROCEDURE — 36014 PLACE CATHETER IN ARTERY: CPT | Mod: 50

## 2020-07-30 PROCEDURE — C1894: CPT

## 2020-07-30 PROCEDURE — 85730 THROMBOPLASTIN TIME PARTIAL: CPT

## 2020-07-30 RX ORDER — ONDANSETRON 8 MG/1
4 TABLET, FILM COATED ORAL EVERY 4 HOURS
Refills: 0 | Status: DISCONTINUED | OUTPATIENT
Start: 2020-07-30 | End: 2020-07-30

## 2020-08-12 DIAGNOSIS — Q25.72 CONGENITAL PULMONARY ARTERIOVENOUS MALFORMATION: ICD-10-CM

## 2020-08-12 PROBLEM — D73.5 SPLENIC INFARCT: Status: ACTIVE | Noted: 2020-07-22

## 2020-08-13 ENCOUNTER — APPOINTMENT (OUTPATIENT)
Dept: VASCULAR SURGERY | Facility: CLINIC | Age: 30
End: 2020-08-13
Payer: COMMERCIAL

## 2020-08-13 DIAGNOSIS — D73.5 INFARCTION OF SPLEEN: ICD-10-CM

## 2020-08-13 PROCEDURE — 99214 OFFICE O/P EST MOD 30 MIN: CPT

## 2020-08-14 NOTE — REASON FOR VISIT
[de-identified] : 4/30/2020 [de-identified] : 31 yo female with splenic infarct. mesenteric and pulmonary angiogram was done with Dr. Guillen to r/o AVM which was normal. She continues to have occasional abdominal "twinge" but no actual pain.

## 2020-08-14 NOTE — PHYSICAL EXAM
[Respiratory Effort] : normal respiratory effort [Normal Heart Sounds] : normal heart sounds [Ankle Swelling (On Exam)] : not present [Varicose Veins Of Lower Extremities] : not present [Alert] : alert [] : not present [Oriented to Place] : oriented to place [Oriented to Person] : oriented to person [Oriented to Time] : oriented to time [Calm] : calm [de-identified] : well appearing [de-identified] : NT/ND

## 2020-08-14 NOTE — DISCUSSION/SUMMARY
[FreeTextEntry1] : 31 yo female with splenic infarct. mesenteric and pulmonary angiogram was done with Dr. Guillen to r/o AVM which was normal. From vascular standpoint she can stop her AC after 3 months. FU here as needed.

## 2020-09-03 NOTE — PROGRESS NOTE ADULT - SUBJECTIVE AND OBJECTIVE BOX
SUBJECTIVE: Feeling better, no pain, tolerating diet. Patient seen and examined bedside by chief resident.    apixaban 10 milliGRAM(s) Oral every 12 hours    MEDICATIONS  (PRN):  ondansetron Injectable 4 milliGRAM(s) IV Push every 6 hours PRN Nausea and/or Vomiting      I&O's Detail    11 Mar 2020 07:01  -  12 Mar 2020 07:00  --------------------------------------------------------  IN:  Total IN: 0 mL    OUT:    Voided: 3300 mL  Total OUT: 3300 mL    Total NET: -3300 mL          Vital Signs Last 24 Hrs  T(C): 36.8 (12 Mar 2020 05:38), Max: 38.2 (11 Mar 2020 22:44)  T(F): 98.2 (12 Mar 2020 05:38), Max: 100.8 (11 Mar 2020 22:44)  HR: 75 (12 Mar 2020 05:38) (75 - 87)  BP: 120/74 (12 Mar 2020 05:38) (99/63 - 120/74)  BP(mean): --  RR: 16 (12 Mar 2020 05:38) (16 - 18)  SpO2: 98% (12 Mar 2020 05:38) (98% - 99%)    General: NAD, resting comfortably in bed  C/V: NSR  Pulm: Nonlabored breathing, no respiratory distress  Abd: soft, NT/ND    LABS:                        13.3   8.55  )-----------( 248      ( 12 Mar 2020 06:29 )             39.2     03-12    137  |  104  |  6<L>  ----------------------------<  107<H>  4.0   |  20<L>  |  0.77    Ca    9.0      12 Mar 2020 06:29  Phos  4.3     03-12  Mg     2.0     03-12 0 12.5

## 2020-09-28 ENCOUNTER — TRANSCRIPTION ENCOUNTER (OUTPATIENT)
Age: 30
End: 2020-09-28

## 2021-10-14 LAB
ALBUMIN SERPL ELPH-MCNC: 4.7 G/DL
ALP BLD-CCNC: 76 U/L
ALT SERPL-CCNC: 14 U/L
ANION GAP SERPL CALC-SCNC: 17 MMOL/L
APTT BLD: 42.8 SEC
AST SERPL-CCNC: 18 U/L
BASOPHILS # BLD AUTO: 0.03 K/UL
BASOPHILS NFR BLD AUTO: 0.7 %
BILIRUB SERPL-MCNC: 0.2 MG/DL
BUN SERPL-MCNC: 17 MG/DL
CALCIUM SERPL-MCNC: 9.8 MG/DL
CHLORIDE SERPL-SCNC: 107 MMOL/L
CO2 SERPL-SCNC: 19 MMOL/L
CREAT SERPL-MCNC: 0.86 MG/DL
EOSINOPHIL # BLD AUTO: 0.08 K/UL
EOSINOPHIL NFR BLD AUTO: 1.8 %
GLUCOSE SERPL-MCNC: 91 MG/DL
HCT VFR BLD CALC: 41.3 %
HGB BLD-MCNC: 13.3 G/DL
IMM GRANULOCYTES NFR BLD AUTO: 0.2 %
INR PPP: 1.21 RATIO
LYMPHOCYTES # BLD AUTO: 2.36 K/UL
LYMPHOCYTES NFR BLD AUTO: 54.4 %
MAN DIFF?: NORMAL
MCHC RBC-ENTMCNC: 30 PG
MCHC RBC-ENTMCNC: 32.2 GM/DL
MCV RBC AUTO: 93 FL
MONOCYTES # BLD AUTO: 0.43 K/UL
MONOCYTES NFR BLD AUTO: 9.9 %
NEUTROPHILS # BLD AUTO: 1.43 K/UL
NEUTROPHILS NFR BLD AUTO: 33 %
PLATELET # BLD AUTO: 276 K/UL
POTASSIUM SERPL-SCNC: 4.9 MMOL/L
PROT SERPL-MCNC: 7.1 G/DL
PT BLD: 14.3 SEC
RBC # BLD: 4.44 M/UL
RBC # FLD: 12.7 %
SODIUM SERPL-SCNC: 143 MMOL/L
WBC # FLD AUTO: 4.34 K/UL

## 2021-10-26 NOTE — ED PROVIDER NOTE - CPE EDP RESP NORM
- - - Consent (Spinal Accessory)/Introductory Paragraph: The rationale for Mohs was explained to the patient and consent was obtained. The risks, benefits and alternatives to therapy were discussed in detail. Specifically, the risks of damage to the spinal accessory nerve, infection, scarring, bleeding, prolonged wound healing, incomplete removal, allergy to anesthesia, and recurrence were addressed. Prior to the procedure, the treatment site was clearly identified and confirmed by the patient. All components of Universal Protocol/PAUSE Rule completed.

## 2022-03-07 ENCOUNTER — EMERGENCY (EMERGENCY)
Facility: HOSPITAL | Age: 32
LOS: 1 days | Discharge: HOME | End: 2022-03-07
Admitting: EMERGENCY MEDICINE
Payer: COMMERCIAL

## 2022-03-07 VITALS
DIASTOLIC BLOOD PRESSURE: 82 MMHG | TEMPERATURE: 98 F | WEIGHT: 197.98 LBS | OXYGEN SATURATION: 98 % | SYSTOLIC BLOOD PRESSURE: 129 MMHG | HEART RATE: 63 BPM | HEIGHT: 65 IN | RESPIRATION RATE: 18 BRPM

## 2022-03-07 VITALS — TEMPERATURE: 100 F

## 2022-03-07 DIAGNOSIS — R07.89 OTHER CHEST PAIN: ICD-10-CM

## 2022-03-07 DIAGNOSIS — Z91.011 ALLERGY TO MILK PRODUCTS: ICD-10-CM

## 2022-03-07 DIAGNOSIS — L50.9 URTICARIA, UNSPECIFIED: ICD-10-CM

## 2022-03-07 LAB
ALBUMIN SERPL ELPH-MCNC: 3.7 G/DL — SIGNIFICANT CHANGE UP (ref 3.4–5)
ALP SERPL-CCNC: 69 U/L — SIGNIFICANT CHANGE UP (ref 40–120)
ALT FLD-CCNC: 22 U/L — SIGNIFICANT CHANGE UP (ref 12–42)
ANION GAP SERPL CALC-SCNC: 5 MMOL/L — LOW (ref 9–16)
APTT BLD: 35.4 SEC — SIGNIFICANT CHANGE UP (ref 27.5–35.5)
AST SERPL-CCNC: 21 U/L — SIGNIFICANT CHANGE UP (ref 15–37)
BASOPHILS # BLD AUTO: 0.07 K/UL — SIGNIFICANT CHANGE UP (ref 0–0.2)
BASOPHILS NFR BLD AUTO: 1 % — SIGNIFICANT CHANGE UP (ref 0–2)
BILIRUB SERPL-MCNC: 0.3 MG/DL — SIGNIFICANT CHANGE UP (ref 0.2–1.2)
BUN SERPL-MCNC: 17 MG/DL — SIGNIFICANT CHANGE UP (ref 7–23)
CALCIUM SERPL-MCNC: 9 MG/DL — SIGNIFICANT CHANGE UP (ref 8.5–10.5)
CHLORIDE SERPL-SCNC: 103 MMOL/L — SIGNIFICANT CHANGE UP (ref 96–108)
CO2 SERPL-SCNC: 27 MMOL/L — SIGNIFICANT CHANGE UP (ref 22–31)
CREAT SERPL-MCNC: 0.94 MG/DL — SIGNIFICANT CHANGE UP (ref 0.5–1.3)
D DIMER BLD IA.RAPID-MCNC: 211 NG/ML DDU — SIGNIFICANT CHANGE UP
EGFR: 84 ML/MIN/1.73M2 — SIGNIFICANT CHANGE UP
EOSINOPHIL # BLD AUTO: 0 K/UL — SIGNIFICANT CHANGE UP (ref 0–0.5)
EOSINOPHIL NFR BLD AUTO: 0 % — SIGNIFICANT CHANGE UP (ref 0–6)
GIANT PLATELETS BLD QL SMEAR: PRESENT — SIGNIFICANT CHANGE UP
GLUCOSE SERPL-MCNC: 95 MG/DL — SIGNIFICANT CHANGE UP (ref 70–99)
HCG SERPL-ACNC: <1 MIU/ML — SIGNIFICANT CHANGE UP
HCT VFR BLD CALC: 39 % — SIGNIFICANT CHANGE UP (ref 34.5–45)
HGB BLD-MCNC: 12.8 G/DL — SIGNIFICANT CHANGE UP (ref 11.5–15.5)
INR BLD: 1.07 — SIGNIFICANT CHANGE UP (ref 0.88–1.16)
LG PLATELETS BLD QL AUTO: SLIGHT — SIGNIFICANT CHANGE UP
LYMPHOCYTES # BLD AUTO: 3.53 K/UL — HIGH (ref 1–3.3)
LYMPHOCYTES # BLD AUTO: 48 % — HIGH (ref 13–44)
MAGNESIUM SERPL-MCNC: 1.7 MG/DL — SIGNIFICANT CHANGE UP (ref 1.6–2.6)
MANUAL SMEAR VERIFICATION: SIGNIFICANT CHANGE UP
MCHC RBC-ENTMCNC: 30.5 PG — SIGNIFICANT CHANGE UP (ref 27–34)
MCHC RBC-ENTMCNC: 32.8 GM/DL — SIGNIFICANT CHANGE UP (ref 32–36)
MCV RBC AUTO: 92.9 FL — SIGNIFICANT CHANGE UP (ref 80–100)
MICROCYTES BLD QL: SLIGHT — SIGNIFICANT CHANGE UP
MONOCYTES # BLD AUTO: 0.74 K/UL — SIGNIFICANT CHANGE UP (ref 0–0.9)
MONOCYTES NFR BLD AUTO: 10 % — SIGNIFICANT CHANGE UP (ref 2–14)
NEUTROPHILS # BLD AUTO: 2.72 K/UL — SIGNIFICANT CHANGE UP (ref 1.8–7.4)
NEUTROPHILS NFR BLD AUTO: 37 % — LOW (ref 43–77)
NRBC # BLD: 0 /100 — SIGNIFICANT CHANGE UP (ref 0–0)
NRBC # BLD: SIGNIFICANT CHANGE UP /100 WBCS (ref 0–0)
NT-PROBNP SERPL-SCNC: 42 PG/ML — SIGNIFICANT CHANGE UP
PLAT MORPH BLD: ABNORMAL
PLATELET # BLD AUTO: 253 K/UL — SIGNIFICANT CHANGE UP (ref 150–400)
PLATELET CLUMP BLD QL SMEAR: ABNORMAL
POTASSIUM SERPL-MCNC: 3.6 MMOL/L — SIGNIFICANT CHANGE UP (ref 3.5–5.3)
POTASSIUM SERPL-SCNC: 3.6 MMOL/L — SIGNIFICANT CHANGE UP (ref 3.5–5.3)
PROT SERPL-MCNC: 7.5 G/DL — SIGNIFICANT CHANGE UP (ref 6.4–8.2)
PROTHROM AB SERPL-ACNC: 12.4 SEC — SIGNIFICANT CHANGE UP (ref 10.5–13.4)
RBC # BLD: 4.2 M/UL — SIGNIFICANT CHANGE UP (ref 3.8–5.2)
RBC # FLD: 12.8 % — SIGNIFICANT CHANGE UP (ref 10.3–14.5)
RBC BLD AUTO: ABNORMAL
SODIUM SERPL-SCNC: 135 MMOL/L — SIGNIFICANT CHANGE UP (ref 132–145)
TROPONIN I, HIGH SENSITIVITY RESULT: 11.6 NG/L — SIGNIFICANT CHANGE UP
VARIANT LYMPHS # BLD: 4 % — SIGNIFICANT CHANGE UP (ref 0–6)
WBC # BLD: 7.36 K/UL — SIGNIFICANT CHANGE UP (ref 3.8–10.5)
WBC # FLD AUTO: 7.36 K/UL — SIGNIFICANT CHANGE UP (ref 3.8–10.5)

## 2022-03-07 PROCEDURE — 99285 EMERGENCY DEPT VISIT HI MDM: CPT | Mod: 25

## 2022-03-07 PROCEDURE — 71046 X-RAY EXAM CHEST 2 VIEWS: CPT | Mod: 26

## 2022-03-07 PROCEDURE — 93010 ELECTROCARDIOGRAM REPORT: CPT | Mod: NC

## 2022-03-07 RX ORDER — FAMOTIDINE 10 MG/ML
20 INJECTION INTRAVENOUS DAILY
Refills: 0 | Status: DISCONTINUED | OUTPATIENT
Start: 2022-03-07 | End: 2022-03-10

## 2022-03-07 RX ORDER — DIPHENHYDRAMINE HCL 50 MG
50 CAPSULE ORAL ONCE
Refills: 0 | Status: COMPLETED | OUTPATIENT
Start: 2022-03-07 | End: 2022-03-07

## 2022-03-07 RX ADMIN — FAMOTIDINE 20 MILLIGRAM(S): 10 INJECTION INTRAVENOUS at 03:08

## 2022-03-07 RX ADMIN — Medication 50 MILLIGRAM(S): at 03:07

## 2022-03-07 NOTE — ED PROVIDER NOTE - PATIENT PORTAL LINK FT
You can access the FollowMyHealth Patient Portal offered by [facility] by registering at the following website: [email or web address]. By joining [employer]’s FollowMyHealth portal, you will also be able to view your health information using other applications (apps) compatible with our system.

## 2022-03-07 NOTE — ED PROVIDER NOTE - CLINICAL SUMMARY MEDICAL DECISION MAKING FREE TEXT BOX
32 y/o F presents to ED c/o chest pain.  Pt well appearing, VSS, NAD.  Labs wnl.  CXR negative.  EKG is non-ischemic.  Pt given famotidine and benadryl for urticaria.      Results and diagnosis discussed with patient.  Treatment plan discussed.  Return precautions discussed.  Pt verbalized understanding and given the opportunity to ask questions.  Patient advised to follow up with cardiology and allergist.

## 2022-03-07 NOTE — ED PROVIDER NOTE - CARE PROVIDERS DIRECT ADDRESSES
[email or web address],DirectAddress_Unknown,DirectAddress_Unknown

## 2022-03-07 NOTE — ED PROVIDER NOTE - NS ED ROS FT
Constitutional:  no fever, no chills  Eyes:  no discharge, no irritations, no pain, no redness, visual changes  Ears:  no ear pain, no ear drainage,  no hearing problems  Nose:  no nasal congestion, no nasal drainage  Mouth/Throat:  no hoarseness and no throat pain  Neck:  no stiffness, no pain, no lumps, no swollen glands  Cardiac:  + chest pain, no edema  Respiratory:  no cough, no shortness of breath  GI: no abdominal pain, no bloating, no constipation, no diarrhea, no nausea, no vomiting  :  no dysuria, no urinary frequency, no hematuria, no discharge  MSK:  + back pain, L arm pain, no weakness  NEURO:  no headache, no weakness, no numbness  Skin:  no lesions, no pruritis, no jaundice, no bruising, no rash  Psych:  no known mental health issues  Endocrine:  no diabetes, no thyroid issues

## 2022-03-07 NOTE — ED PROVIDER NOTE - PHYSICAL EXAMINATION
Constitutional:  Well appearing, awake, alert, oriented to person, place, time/situation and in no apparent distress  Head:  NC/AT, symmetric, neck supple  ENMT: Airway patent, nasal mucosa clear, mouth with normal mucosa, throat has no vesicles, no oropharyngeal exudates and uvula is midline  Eyes:  Clear bilaterally, pupils equal, round and reactive to light  Cardiac:  Normal rate, regular rhythm. Heart sounds S1,S2.  No murmurs, rubs or gallops.  No JVD.  Respiratory:  Breath sounds clear and equal bilaterally  GI:   Abd soft, non-distended, non-tender, no guarding  MSK:  Spine appears normal, range of motion is not limited, no muscle or joint tenderness  Neuro:  Alert and oriented, no focal deficits, no motor or sensory deficits  Skin:  Skin normal color for race, warm, dry and intact.  + mild urticaria  Psych:  Normal mood and affect, no apparent risk to self or others.  Heme:  No adenopathy or splenomegaly.

## 2022-03-07 NOTE — ED PROVIDER NOTE - CARE PROVIDER_API CALL
Nilesh Tate)  Cardiovascular Disease  [address] [place] [address]  Phone: [phone]  Fax: [phone]  Follow Up Time:     Tj Horta)  Allergy and Immunology; Pediatrics  [address] [place] [address]  Phone: [phone]  Fax: [phone]  Follow Up Time:     Bernard Chirinos)  Allergy and Immunology  [address] [place] [address]  Phone: [phone]  Fax: [phone]  Follow Up Time:

## 2022-03-07 NOTE — ED PROVIDER NOTE - PROVIDER TOKENS
PROVIDER:[TOKEN:[[id number]]],PROVIDER:[TOKEN:[[id number]]],PROVIDER:[TOKEN:[[id number]]]

## 2022-03-07 NOTE — ED PROVIDER NOTE - OBJECTIVE STATEMENT
32 y/o F with PMH of splenic infarct presents to ED c/o intermittent L chest pain that radiates through to her L upper back and down her L upper arm that started while moving around in her apartments.  She states the initial pain lasted approx 1 hour and it has been intermittent, fleeting pain that lasts for a few seconds since.  She denies any modifying factors.  She has not taken any medications for the pain.  Pt also c/o intermittent hives of unknown cause x 1 week.  She denies any known allergens.  No associated oral swelling, wheezing, throat discomfort or SOB.     Pt denies trauma/falls, fevers/chills, neck, headache, visual changes, sore throat, palpitations, cough, SOB, abd pain, n/v/d, dysuria, hematuria, weakness, dizziness, numbness, lower extremity swelling, sick contacts, recent hospitalizations, recent travels.

## 2022-03-07 NOTE — ED ADULT TRIAGE NOTE - CHIEF COMPLAINT QUOTE
walkin pt with complaints of left sided chest pain that went through to her back and down her left arm since 10pm, currently no pain. Hx splenic infarct in 2020.

## 2022-03-07 NOTE — ED ADULT NURSE NOTE - NSIMPLEMENTINTERV_GEN_ALL_ED
Implemented All Universal Safety Interventions:  [place] to call system. Call bell, personal items and telephone within reach. Instruct patient to call for assistance. Room bathroom lighting operational. Non-slip footwear when patient is off stretcher. Physically safe environment: no spills, clutter or unnecessary equipment. Stretcher in lowest position, wheels locked, appropriate side rails in place.

## 2022-09-08 NOTE — ED PROVIDER NOTE - CLINICAL SUMMARY MEDICAL DECISION MAKING FREE TEXT BOX
Standing/Walking/Toileting
Upper abd pain and fever today.  Ct abd ordered, labs, abx.  Sepsis code protocol followed.  Will place in CDU for diagnostic uncertainty and therapeutic intensity.

## 2024-07-26 ENCOUNTER — EMERGENCY (EMERGENCY)
Facility: HOSPITAL | Age: 34
LOS: 1 days | Discharge: HOME | End: 2024-07-26
Attending: EMERGENCY MEDICINE | Admitting: EMERGENCY MEDICINE
Payer: COMMERCIAL

## 2024-07-26 VITALS
SYSTOLIC BLOOD PRESSURE: 107 MMHG | OXYGEN SATURATION: 98 % | DIASTOLIC BLOOD PRESSURE: 70 MMHG | TEMPERATURE: 98 F | HEART RATE: 77 BPM | RESPIRATION RATE: 16 BRPM

## 2024-07-26 DIAGNOSIS — Z91.011 ALLERGY TO MILK PRODUCTS: ICD-10-CM

## 2024-07-26 DIAGNOSIS — S90.851A SUPERFICIAL FOREIGN BODY, RIGHT FOOT, INITIAL ENCOUNTER: ICD-10-CM
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
## 2024-07-26 PROCEDURE — 73630 X-RAY EXAM OF FOOT: CPT | Mod: 26,RT,76

## 2024-07-26 PROCEDURE — 99284 EMERGENCY DEPT VISIT MOD MDM: CPT

## 2024-07-26 NOTE — ED ADULT NURSE NOTE - BIRTH SEX
How Severe Is Your Skin Lesion?: mild
Have Your Skin Lesions Been Treated?: not been treated
Is This A New Presentation, Or A Follow-Up?: Skin Lesions
Which Family Member (Optional)?: Mother
Female

## 2024-07-26 NOTE — ED ADULT NURSE NOTE - NSFALLUNIVINTERV_ED_ALL_ED
Bed/Stretcher in lowest position, wheels locked, appropriate side rails in place/Call bell, personal items and telephone in reach/Instruct patient to call for assistance before getting out of bed/chair/stretcher/Non-slip footwear applied when patient is off stretcher/[place] to call system/Physically safe environment - no spills, clutter or unnecessary equipment/Purposeful proactive rounding/Room/bathroom lighting operational, light cord in reach

## 2024-07-26 NOTE — ED PROVIDER NOTE - OBJECTIVE STATEMENT
34-year-old female history of splenic infarction currently not on any medications presents with pain to the right heel after walking on the potential small shard of glass.  Patient notes something broke in her bathroom and she swept it but subsequently stepped in a very small piece of glass and suspects it is and stuck in her right heel.  Patient denies numbness paresthesias to the foot denies swelling or bleeding or lacerations.  Otherwise denies any other medical complaints

## 2024-07-26 NOTE — ED PROVIDER NOTE - SKIN WOUND TYPE
There is punctate tenderness to palpation at the posterior right heel no visualized superficial foreign body.  There is no laceration skin is otherwise intact no drainage no induration or fluctuance,

## 2024-07-26 NOTE — ED PROVIDER NOTE - NSFOLLOWUPINSTRUCTIONS_ED_ALL_ED_FT
Please keep region of your right foot clean and dry wash with soap and water and apply over-the-counter Neosporin if needed.  Return to the emergency room if you see redness swelling or drainage from the site.

## 2024-07-26 NOTE — ED PROVIDER NOTE - PATIENT PORTAL LINK FT
You can access the FollowMyHealth Patient Portal offered by [facility] by registering at the following website: [email or web address]. By joining [employer]s FollowMyHealth portal, you will also be able to view your health information using other applications (apps) compatible with our system.

## 2024-07-26 NOTE — ED PROVIDER NOTE - WR INTERPRETATION 2
MSK XR negative - No fracture, No dislocation, No foreign body - Previously seen foreign body is now not visualized

## 2024-07-26 NOTE — ED ADULT NURSE NOTE - OBJECTIVE STATEMENT
Pt is a 34y female c/o foot pain/injury. States she broke glass in her room and thinks she stepped on a piece of it. C/o R heel pain. Denies bleeding.

## 2024-07-26 NOTE — ED ADULT TRIAGE NOTE - CHIEF COMPLAINT QUOTE
c/o of right heel pain. states she possibly stepped on glass. no obvious injury noted. ambulates with steady gait

## 2024-07-26 NOTE — ED PROVIDER NOTE - CLINICAL SUMMARY MEDICAL DECISION MAKING FREE TEXT BOX
34-year-old female history of splenic infarction currently not on any medications presents with pain to the right heel after walking on the potential small shard of glass.  Patient notes something broke in her bathroom and she swept it but subsequently stepped in a very small piece of glass and suspects it is and stuck in her right heel.  Patient denies numbness paresthesias to the foot denies swelling or bleeding or lacerations.  Otherwise denies any other medical complaints    Plan to obtain x-rays to rule out foreign body.  There is no overlying signs of cellulitis abscess or infection.  Otherwise. 34-year-old female history of splenic infarction currently not on any medications presents with pain to the right heel after walking on the potential small shard of glass.  Patient notes something broke in her bathroom and she swept it but subsequently stepped in a very small piece of glass and suspects it is and stuck in her right heel.  Patient denies numbness paresthesias to the foot denies swelling or bleeding or lacerations.  Otherwise denies any other medical complaints. Tetanus up-to-date    Plan to obtain x-rays to rule out foreign body.  There is no overlying signs of cellulitis abscess or infection.  Otherwise.

## 2024-07-27 PROBLEM — D73.5 INFARCTION OF SPLEEN: Chronic | Status: ACTIVE | Noted: 2022-03-07

## 2025-03-22 ENCOUNTER — EMERGENCY (EMERGENCY)
Facility: HOSPITAL | Age: 35
LOS: 1 days | Discharge: HOME | End: 2025-03-22
Attending: EMERGENCY MEDICINE | Admitting: EMERGENCY MEDICINE
Payer: COMMERCIAL

## 2025-03-22 VITALS
HEART RATE: 89 BPM | DIASTOLIC BLOOD PRESSURE: 87 MMHG | RESPIRATION RATE: 18 BRPM | OXYGEN SATURATION: 98 % | SYSTOLIC BLOOD PRESSURE: 127 MMHG | WEIGHT: 192.9 LBS | TEMPERATURE: 100 F | HEIGHT: 65 IN

## 2025-03-22 DIAGNOSIS — R11.2 NAUSEA WITH VOMITING, UNSPECIFIED: ICD-10-CM

## 2025-03-22 DIAGNOSIS — Z91.011 ALLERGY TO MILK PRODUCTS: ICD-10-CM

## 2025-03-22 DIAGNOSIS — J06.9 ACUTE UPPER RESPIRATORY INFECTION, UNSPECIFIED: ICD-10-CM

## 2025-03-22 DIAGNOSIS — B97.89 OTHER VIRAL AGENTS AS THE CAUSE OF DISEASES CLASSIFIED ELSEWHERE: ICD-10-CM

## 2025-03-22 LAB
ALBUMIN SERPL ELPH-MCNC: 3.4 G/DL — SIGNIFICANT CHANGE UP (ref 3.4–5)
ALP SERPL-CCNC: 92 U/L — SIGNIFICANT CHANGE UP (ref 40–120)
ALT FLD-CCNC: 23 U/L — SIGNIFICANT CHANGE UP (ref 12–42)
ANION GAP SERPL CALC-SCNC: 10 MMOL/L — SIGNIFICANT CHANGE UP (ref 9–16)
AST SERPL-CCNC: 25 U/L — SIGNIFICANT CHANGE UP (ref 15–37)
BASOPHILS # BLD AUTO: 0.01 K/UL — SIGNIFICANT CHANGE UP (ref 0–0.2)
BASOPHILS NFR BLD AUTO: 0.2 % — SIGNIFICANT CHANGE UP (ref 0–2)
BILIRUB SERPL-MCNC: 0.7 MG/DL — SIGNIFICANT CHANGE UP (ref 0.2–1.2)
BUN SERPL-MCNC: 11 MG/DL — SIGNIFICANT CHANGE UP (ref 7–23)
CALCIUM SERPL-MCNC: 8.9 MG/DL — SIGNIFICANT CHANGE UP (ref 8.5–10.5)
CHLORIDE SERPL-SCNC: 103 MMOL/L — SIGNIFICANT CHANGE UP (ref 96–108)
CO2 SERPL-SCNC: 23 MMOL/L — SIGNIFICANT CHANGE UP (ref 22–31)
CREAT SERPL-MCNC: 1.01 MG/DL — SIGNIFICANT CHANGE UP (ref 0.5–1.3)
EGFR: 76 ML/MIN/1.73M2 — SIGNIFICANT CHANGE UP
EGFR: 76 ML/MIN/1.73M2 — SIGNIFICANT CHANGE UP
EOSINOPHIL # BLD AUTO: 0.01 K/UL — SIGNIFICANT CHANGE UP (ref 0–0.5)
EOSINOPHIL NFR BLD AUTO: 0.2 % — SIGNIFICANT CHANGE UP (ref 0–6)
FLUAV AG NPH QL: SIGNIFICANT CHANGE UP
FLUBV AG NPH QL: SIGNIFICANT CHANGE UP
GLUCOSE SERPL-MCNC: 97 MG/DL — SIGNIFICANT CHANGE UP (ref 70–99)
HCT VFR BLD CALC: 37.3 % — SIGNIFICANT CHANGE UP (ref 34.5–45)
HGB BLD-MCNC: 12.7 G/DL — SIGNIFICANT CHANGE UP (ref 11.5–15.5)
IMM GRANULOCYTES # BLD AUTO: 0.02 K/UL — SIGNIFICANT CHANGE UP (ref 0–0.07)
IMM GRANULOCYTES NFR BLD AUTO: 0.4 % — SIGNIFICANT CHANGE UP (ref 0–0.9)
LIDOCAIN IGE QN: 30 U/L — SIGNIFICANT CHANGE UP (ref 16–77)
LYMPHOCYTES # BLD AUTO: 0.64 K/UL — LOW (ref 1–3.3)
LYMPHOCYTES NFR BLD AUTO: 13.1 % — SIGNIFICANT CHANGE UP (ref 13–44)
MCHC RBC-ENTMCNC: 30.6 PG — SIGNIFICANT CHANGE UP (ref 27–34)
MCHC RBC-ENTMCNC: 34 G/DL — SIGNIFICANT CHANGE UP (ref 32–36)
MCV RBC AUTO: 89.9 FL — SIGNIFICANT CHANGE UP (ref 80–100)
MONOCYTES # BLD AUTO: 0.42 K/UL — SIGNIFICANT CHANGE UP (ref 0–0.9)
MONOCYTES NFR BLD AUTO: 8.6 % — SIGNIFICANT CHANGE UP (ref 2–14)
NEUTROPHILS # BLD AUTO: 3.77 K/UL — SIGNIFICANT CHANGE UP (ref 1.8–7.4)
NEUTROPHILS NFR BLD AUTO: 77.5 % — HIGH (ref 43–77)
NRBC # BLD AUTO: 0 K/UL — SIGNIFICANT CHANGE UP (ref 0–0)
NRBC # FLD: 0 K/UL — SIGNIFICANT CHANGE UP (ref 0–0)
NRBC BLD AUTO-RTO: 0 /100 WBCS — SIGNIFICANT CHANGE UP (ref 0–0)
PLATELET # BLD AUTO: 242 K/UL — SIGNIFICANT CHANGE UP (ref 150–400)
PMV BLD: 10.6 FL — SIGNIFICANT CHANGE UP (ref 7–13)
POTASSIUM SERPL-MCNC: 3.6 MMOL/L — SIGNIFICANT CHANGE UP (ref 3.5–5.3)
POTASSIUM SERPL-SCNC: 3.6 MMOL/L — SIGNIFICANT CHANGE UP (ref 3.5–5.3)
PROT SERPL-MCNC: 7.4 G/DL — SIGNIFICANT CHANGE UP (ref 6.4–8.2)
RBC # BLD: 4.15 M/UL — SIGNIFICANT CHANGE UP (ref 3.8–5.2)
RBC # FLD: 12.7 % — SIGNIFICANT CHANGE UP (ref 10.3–14.5)
RSV RNA NPH QL NAA+NON-PROBE: SIGNIFICANT CHANGE UP
SARS-COV-2 RNA SPEC QL NAA+PROBE: SIGNIFICANT CHANGE UP
SODIUM SERPL-SCNC: 136 MMOL/L — SIGNIFICANT CHANGE UP (ref 132–145)
SOURCE RESPIRATORY: SIGNIFICANT CHANGE UP
WBC # BLD: 4.87 K/UL — SIGNIFICANT CHANGE UP (ref 3.8–10.5)
WBC # FLD AUTO: 4.87 K/UL — SIGNIFICANT CHANGE UP (ref 3.8–10.5)

## 2025-03-22 PROCEDURE — 99284 EMERGENCY DEPT VISIT MOD MDM: CPT

## 2025-03-22 RX ORDER — ACETAMINOPHEN 500 MG/5ML
650 LIQUID (ML) ORAL ONCE
Refills: 0 | Status: COMPLETED | OUTPATIENT
Start: 2025-03-22 | End: 2025-03-22

## 2025-03-22 RX ORDER — SODIUM CHLORIDE 9 G/1000ML
2000 INJECTION, SOLUTION INTRAVENOUS ONCE
Refills: 0 | Status: COMPLETED | OUTPATIENT
Start: 2025-03-22 | End: 2025-03-22

## 2025-03-22 RX ORDER — ONDANSETRON HCL/PF 4 MG/2 ML
4 VIAL (ML) INJECTION ONCE
Refills: 0 | Status: COMPLETED | OUTPATIENT
Start: 2025-03-22 | End: 2025-03-22

## 2025-03-22 RX ORDER — KETOROLAC TROMETHAMINE 30 MG/ML
15 INJECTION, SOLUTION INTRAMUSCULAR; INTRAVENOUS ONCE
Refills: 0 | Status: DISCONTINUED | OUTPATIENT
Start: 2025-03-22 | End: 2025-03-22

## 2025-03-22 RX ADMIN — Medication 4 MILLIGRAM(S): at 03:37

## 2025-03-22 RX ADMIN — Medication 650 MILLIGRAM(S): at 03:37

## 2025-03-22 RX ADMIN — KETOROLAC TROMETHAMINE 15 MILLIGRAM(S): 30 INJECTION, SOLUTION INTRAMUSCULAR; INTRAVENOUS at 03:37

## 2025-03-22 RX ADMIN — SODIUM CHLORIDE 2000 MILLILITER(S): 9 INJECTION, SOLUTION INTRAVENOUS at 03:38

## 2025-03-22 NOTE — ED PROVIDER NOTE - NS ED ROS FT
Review of Systems    Constitutional: (-) weakness (-) diaphoresis   Eyes: (-) change in vision (-) phtophobia (-) eye pain  ENT: (-) sore throat (-) ear ache (-) nasal discharge  Cardiovascular: (-) chest pain  (-) palpitations  Respiratory: (-) SOB (-) cough   GI: (-) abdominal pain  Integumentary: (-) rash (-) redness   Neurological:  (-) focal deficit (-) altered mental status

## 2025-03-22 NOTE — ED PROVIDER NOTE - CLINICAL SUMMARY MEDICAL DECISION MAKING FREE TEXT BOX
33 yo f no sig pmhx here with body aches with nausea with 2 episodes of nbnb vomiting and 2 episodes of loose brown stools ongoing for 1 d in duration with no provoking or modifying factors. The symptoms began shortly today. No cough, sore throat, chest pain or sob. Soft non tender abdomen with clear lung sounds, suspected influenza like illness, plan: cbc, cmp, lip, viral swab, iv fluds

## 2025-03-22 NOTE — ED PROVIDER NOTE - NS ED ATTENDING STATEMENT MOD
I have seen and examined this patient and fully participated in the care of this patient as the teaching attending.  The service was shared with the TEQUILA.  I reviewed and verified the documentation.

## 2025-03-22 NOTE — ED ADULT TRIAGE NOTE - CHIEF COMPLAINT QUOTE
walk in pt with complaints of fever, vomiting, diarrhea x 1 day. Denies abdominal pain. Oral temp 100.2. Motrin last taken at 3pm.

## 2025-03-22 NOTE — ED PROVIDER NOTE - OBJECTIVE STATEMENT
33 yo f no sig pmhx here with body aches with nausea with 2 episodes of nbnb vomiting and 2 episodes of loose brown stools ongoing for 1 d in duration with no provoking or modifying factors. The symptoms began shortly today. No cough, sore throat, chest pain or sob.    I have reviewed available current nursing and previous documentation of past medical, surgical, family, and/or social history.

## 2025-03-22 NOTE — ED PROVIDER NOTE - PATIENT PORTAL LINK FT
You can access the FollowMyHealth Patient Portal offered by [facility] by registering at the following website: [email or web address]. By joining [employer]’s FollowMyHealth portal, you will also be able to view your health information using other applications (apps) compatible with our system.

## 2025-03-22 NOTE — ED PROVIDER NOTE - ATTENDING CONTRIBUTION TO CARE
Patient is a 34-year-old female who denies any past medical history and presents with bodyaches, nausea, vomiting, and loose stools for the past 24 hours.  Patient does report eating a Gyro yesterday before symptoms started.  She vomited x 2, nonbloody nonbilious.  No specific abdominal pain.  Agree with exam as above.  Assessment and plan:  Gastroenteritis.  Food poisoning versus stomach virus.  Labs within normal limits.  Improved with supportive care/treatment and tolerated p.o. prior to discharge.  Instructed to follow-up with her primary care doctor.  Emergent return precautions discussed.  Patient demonstrates full understanding.

## 2025-04-29 NOTE — ED PROVIDER NOTE - DISCHARGE DATE
[facility] refill request for:    oxyCODONE, IMM REL, (ROXICODONE) 5 MG immediate release tablet -- -- 2025 --    Si mg every AM scheduled, plus every 4 hours prn pain      NH: 2025  Route to provider for review     27-Jul-2024